# Patient Record
Sex: FEMALE | Race: BLACK OR AFRICAN AMERICAN | Employment: OTHER | RURAL
[De-identification: names, ages, dates, MRNs, and addresses within clinical notes are randomized per-mention and may not be internally consistent; named-entity substitution may affect disease eponyms.]

---

## 2017-02-20 DIAGNOSIS — I10 ESSENTIAL HYPERTENSION: ICD-10-CM

## 2017-02-20 RX ORDER — METOPROLOL SUCCINATE 50 MG/1
75 TABLET, EXTENDED RELEASE ORAL DAILY
Qty: 135 TAB | Refills: 3 | Status: SHIPPED | OUTPATIENT
Start: 2017-02-20 | End: 2018-01-30 | Stop reason: SDUPTHER

## 2017-02-28 DIAGNOSIS — I10 ESSENTIAL HYPERTENSION: ICD-10-CM

## 2017-02-28 RX ORDER — CHLORTHALIDONE 25 MG/1
25 TABLET ORAL DAILY
Qty: 90 TAB | Refills: 1 | Status: SHIPPED | OUTPATIENT
Start: 2017-02-28 | End: 2017-09-24 | Stop reason: SDUPTHER

## 2017-04-07 RX ORDER — POTASSIUM CHLORIDE 20 MEQ/1
20 TABLET, EXTENDED RELEASE ORAL DAILY
Qty: 90 TAB | Refills: 3 | Status: SHIPPED | OUTPATIENT
Start: 2017-04-07 | End: 2018-04-28 | Stop reason: SDUPTHER

## 2017-05-22 RX ORDER — AMLODIPINE BESYLATE 10 MG/1
10 TABLET ORAL DAILY
Qty: 90 TAB | Refills: 3 | Status: SHIPPED | OUTPATIENT
Start: 2017-05-22 | End: 2018-05-24 | Stop reason: SDUPTHER

## 2017-06-07 RX ORDER — ZOLPIDEM TARTRATE 10 MG/1
10 TABLET ORAL
Qty: 30 TAB | Refills: 1 | Status: SHIPPED | OUTPATIENT
Start: 2017-06-07 | End: 2017-08-24 | Stop reason: SDUPTHER

## 2017-07-12 ENCOUNTER — OFFICE VISIT (OUTPATIENT)
Dept: FAMILY MEDICINE CLINIC | Age: 72
End: 2017-07-12

## 2017-07-12 VITALS
SYSTOLIC BLOOD PRESSURE: 126 MMHG | TEMPERATURE: 97.7 F | DIASTOLIC BLOOD PRESSURE: 74 MMHG | RESPIRATION RATE: 16 BRPM | OXYGEN SATURATION: 98 % | HEART RATE: 75 BPM | BODY MASS INDEX: 29.7 KG/M2 | HEIGHT: 68 IN | WEIGHT: 196 LBS

## 2017-07-12 DIAGNOSIS — I10 ESSENTIAL HYPERTENSION, BENIGN: Primary | ICD-10-CM

## 2017-07-12 DIAGNOSIS — N32.81 OAB (OVERACTIVE BLADDER): ICD-10-CM

## 2017-07-12 DIAGNOSIS — M79.7 FIBROMYALGIA: ICD-10-CM

## 2017-07-12 DIAGNOSIS — E78.00 HIGH CHOLESTEROL: ICD-10-CM

## 2017-07-12 LAB
BILIRUB UR QL STRIP: NORMAL
GLUCOSE UR-MCNC: NEGATIVE MG/DL
KETONES P FAST UR STRIP-MCNC: NORMAL MG/DL
PH UR STRIP: 7 [PH] (ref 4.6–8)
PROT UR QL STRIP: NORMAL MG/DL
SP GR UR STRIP: 1.01 (ref 1–1.03)
UA UROBILINOGEN AMB POC: NORMAL (ref 0.2–1)
URINALYSIS CLARITY POC: CLEAR
URINALYSIS COLOR POC: NORMAL
URINE BLOOD POC: NEGATIVE
URINE LEUKOCYTES POC: NORMAL
URINE NITRITES POC: NEGATIVE

## 2017-07-12 RX ORDER — FLUOXETINE HYDROCHLORIDE 20 MG/1
CAPSULE ORAL
Refills: 0 | COMMUNITY
Start: 2017-05-01 | End: 2021-11-16 | Stop reason: DRUGHIGH

## 2017-07-12 RX ORDER — MELOXICAM 15 MG/1
TABLET ORAL
Refills: 0 | COMMUNITY
Start: 2017-06-16 | End: 2018-05-11 | Stop reason: SDUPTHER

## 2017-07-12 RX ORDER — OXYBUTYNIN CHLORIDE 5 MG/1
5 TABLET ORAL 2 TIMES DAILY
Qty: 60 TAB | Refills: 11 | Status: SHIPPED | OUTPATIENT
Start: 2017-07-12 | End: 2018-07-17 | Stop reason: SDUPTHER

## 2017-07-12 RX ORDER — LOSARTAN POTASSIUM 50 MG/1
50 TABLET ORAL DAILY
Qty: 90 TAB | Refills: 3 | Status: SHIPPED | OUTPATIENT
Start: 2017-07-12 | End: 2018-07-26 | Stop reason: SDUPTHER

## 2017-07-12 RX ORDER — ATORVASTATIN CALCIUM 40 MG/1
TABLET, FILM COATED ORAL
Qty: 90 TAB | Refills: 3 | Status: SHIPPED | OUTPATIENT
Start: 2017-07-12 | End: 2018-07-27 | Stop reason: SDUPTHER

## 2017-07-12 NOTE — MR AVS SNAPSHOT
Visit Information Date & Time Provider Department Dept. Phone Encounter #  
 7/12/2017  9:30 AM Carolynn Tello MD [address] [place] [address] [id number] Follow-up Instructions Return in about 3 months (around 10/12/2017). Follow-up and Disposition History Upcoming Health Maintenance Date Due Hepatitis C Screening 1945 DTaP/Tdap/Td series (1 - Tdap) 10/17/1966 FOBT Q 1 YEAR AGE 50-75 10/17/1995 ZOSTER VACCINE AGE 60> 10/17/2005 GLAUCOMA SCREENING Q2Y 10/17/2010 OSTEOPOROSIS SCREENING (DEXA) 10/17/2010 BREAST CANCER SCRN MAMMOGRAM 10/29/2017 INFLUENZA AGE 9 TO ADULT 8/1/2017 MEDICARE YEARLY EXAM 8/11/2017 Pneumococcal 65+ Low/Medium Risk (2 of 2 - PPSV23) 3/4/2020 Allergies as of 7/12/2017  Review Complete On: 7/12/2017 By: Carolynn Tello MD  
 No Known Allergies Current Immunizations  Reviewed on 7/12/2017 Name Date Influenza Vaccine 9/25/2015, 10/9/2014 12:00 AM, 10/2/2013 12:00 AM, 11/19/2012 12:00 AM  
 Pneumococcal Conjugate (PCV-13) 6/11/2017 Pneumococcal Polysaccharide (PPSV-23) 10/1/2010 12:00 AM  
 Pneumococcal Vaccine (Unspecified Type) 3/4/2015, 1/26/2015 12:00 AM  
 Zoster Vaccine, Live 12/22/2015 12:00 AM  
  
 Reviewed by Palmira Ackerman LPN on 6/51/7382 at  9:42 AM  
 Reviewed by Palmira Ackerman LPN on 5/91/4252 at  9:45 AM  
You Were Diagnosed With   
  
 Codes Comments Essential hypertension, benign    -  Primary ICD-10-CM: I10 
ICD-9-CM: 401.1 Fibromyalgia     ICD-10-CM: M79.7 ICD-9-CM: 729.1 High cholesterol     ICD-10-CM: E78.00 ICD-9-CM: 272.0   
 OAB (overactive bladder)     ICD-10-CM: N32.81 ICD-9-CM: 596.51 Vitals BP Pulse Temp Resp Height(growth percentile) Weight(growth percentile) 126/74 75 97.7 °F (36.5 °C) (Oral) 16 5' 8\" (1.727 m) 196 lb (88.9 kg) SpO2 BMI OB Status Smoking Status 98% 29.8 kg/m2 Postmenopausal Never Smoker Vitals History BMI and BSA Data Body Mass Index Body Surface Area  
 29.8 kg/m 2 2.07 m 2 Preferred Pharmacy Pharmacy Name Phone [facility] [phone] Your Updated Medication List  
  
   
This list is accurate as of: 7/12/17 10:41 AM.  Always use your most recent med list. amLODIPine 10 mg tablet Commonly known as:  Noe Inks Take 1 Tab by mouth daily. Indications: pressure ARTIFICIAL TEARS(NAXN90-XKEIL) ophthalmic solution Generic drug:  dextran 70-hypromellose Administer 2 Drops to both eyes as needed. Indications: DRY EYE  
  
 ascorbic acid (vitamin C) 1,000 mg tablet Commonly known as:  VITAMIN C Take  by mouth. atorvastatin 40 mg tablet Commonly known as:  LIPITOR  
take 1 tablet by mouth once daily for heart and cholesterol CALCIUM 600 PO Take 600 mg by mouth daily. CENTRUM SILVER ULTRA WOMEN'S PO Take  by mouth. chlorthalidone 25 mg tablet Commonly known as:  Margo Presto Take 1 Tab by mouth daily. Indications: Edema, hypertension  
  
 cholecalciferol (VITAMIN D3) 5,000 unit Tab tablet Commonly known as:  VITAMIN D3 Take  by mouth daily. cyanocobalamin 1,000 mcg tablet Take 1,000 mcg by mouth daily. Indications: PREVENTION OF VITAMIN B12 DEFICIENCY  
  
 cyclobenzaprine 10 mg tablet Commonly known as:  FLEXERIL Take 20 mg by mouth. Take 20 mg pc dinner for fibromyalgia and to aid sleep. Indications: FIBROMYALGIA, MUSCLE SPASM  
  
 diphenhydrAMINE 25 mg capsule Commonly known as:  BENADRYL Take 50 mg by mouth nightly as needed. fish oil-dha-epa 1,200-144-216 mg Cap Take  by mouth. flaxseed 1,000 mg Cap Take 1,000 mg by mouth daily. FLUoxetine 20 mg capsule Commonly known as:  PROzac  
  
 gabapentin 300 mg capsule Commonly known as:  NEURONTIN  
take 1 capsule by mouth three times a day losartan 50 mg tablet Commonly known as:  COZAAR Take 1 Tab by mouth daily. Indications: hypertension, pressure  
  
 meloxicam 15 mg tablet Commonly known as:  MOBIC  
  
 metoprolol succinate 50 mg XL tablet Commonly known as:  TOPROL-XL Take 1.5 Tabs by mouth daily. Indications: hypertension MIRALAX 17 gram packet Generic drug:  polyethylene glycol Take 17 g by mouth daily. morinda citrifolia fruit 250 mg Cap Take 250 mg by mouth. omeprazole 40 mg capsule Commonly known as:  PRILOSEC  
take 1 capsule by mouth once daily  
  
 oxybutynin 5 mg tablet Commonly known as:  QGJDOHXI Take 1 Tab by mouth two (2) times a day. Indications: overactive bladder  
  
 potassium chloride 20 mEq tablet Commonly known as:  K-DUR, KLOR-CON Take 1 Tab by mouth daily. Indications: mineral  
  
 psyllium seed-sucrose Powd Take 1 Cap by mouth daily. (capful)   Indications: CONSTIPATION  
  
 traMADol 50 mg tablet Commonly known as:  ULTRAM  
2 tabs 2 times per day for pain  Indications: FIBROMYALGIA, PAIN  
  
 zolpidem 10 mg tablet Commonly known as:  AMBIEN Take 1 Tab by mouth nightly as needed for Sleep. Max Daily Amount: 10 mg. Indications: sleep Prescriptions Sent to Pharmacy Refills  
 atorvastatin (LIPITOR) 40 mg tablet 3 Sig: take 1 tablet by mouth once daily for heart and cholesterol Class: Normal  
 Pharmacy: [address] Ph #: [phone]  
 losartan (COZAAR) 50 mg tablet 3 Sig: Take 1 Tab by mouth daily. Indications: hypertension, pressure Class: Normal  
 Pharmacy: [address] Ph #: [phone] Route: Oral  
 oxybutynin (DITROPAN) 5 mg tablet 11 Sig: Take 1 Tab by mouth two (2) times a day. Indications: overactive bladder Class: Normal  
 Pharmacy: [address] Ph #: [phone] Route: Oral  
  
We Performed the Following AMB POC URINALYSIS DIP STICK AUTO W/O MICRO [85456 CPT(R)] Comments: AMB POC URINALYSIS DIP STICK AUTO W/O  
 LIPID PANEL [55921 CPT(R)] METABOLIC PANEL, COMPREHENSIVE [31352 CPT(R)] Follow-up Instructions Return in about 3 months (around 10/12/2017). Patient Instructions Please have your gynecologist check you for bladder drop and urethral caruncle, those can cause overactive bladder symptoms/leaks, but are not treated with the pill I've given you. Urge Incontinence in Women/overactive bladder: Care Instructions Your Care Instructions Urge incontinence occurs when the need to urinate is so strong that you cannot reach the toilet in time, even when your bladder contains only a small amount of urine. This is also called overactive bladder or unstable bladder. Some women may have no warning before they leak urine. This condition does not cause major health problems, but it can be embarrassing and can affect a woman's self-esteem and confidence. Treatment can cure or improve your symptoms. Follow-up care is a key part of your treatment and safety. Be sure to make and go to all appointments, and call your doctor if you are having problems. It's also a good idea to know your test results and keep a list of the medicines you take. How can you care for yourself at home? · Take your medicines exactly as prescribed. Call your doctor if you think you are having a problem with your medicine. You will get more details on the specific medicines your doctor prescribes. · Limit caffeine and alcoholthey stimulate urine production. · Urinate every 2 to 4 hours during waking hours, even if you feel that you do not have to go. · Do pelvic floor (Kegel) exercises, which tighten and strengthen pelvic muscles. To do Kegel exercises: 
¨ Squeeze the same muscles you would use to stop your urine. Your belly and thighs should not move. ¨ Hold the squeeze for 3 seconds, then relax for 3 seconds. ¨ Start with 3 seconds. Then add 1 second each week until you are able to squeeze for 10 seconds. ¨ Repeat the exercise 10 to 15 times for each session. Do three or more sessions each day. · Try wearing pads that absorb the leaks. · Keep skin in the genital area dry. When should you call for help? Call your doctor now or seek immediate medical care if: 
· You have symptoms of a urinary infection. For example: ¨ You have blood or pus in your urine. ¨ You have pain in your back just below your rib cage. This is called flank pain. ¨ You have a fever, chills, or body aches. ¨ It hurts to urinate. ¨ You have groin or belly pain. Watch closely for changes in your health, and be sure to contact your doctor if: 
· You have a hard time urinating when your bladder feels full. · You feel like you need to urinate often. · Your bladder feels full even after you urinate. · Your symptoms are getting worse. Where can you learn more? Go to http://sandi-kendall.info/. Enter Y366 in the search box to learn more about \"Urge Incontinence in Women: Care Instructions. \" Current as of: October 13, 2016 Content Version: 11.3 © 9806-9548 [employer]. Care instructions adapted under license by [employer] (which disclaims liability or warranty for this information). If you have questions about a medical condition or this instruction, always ask your healthcare professional. [place] [address] any warranty or liability for your use of this information. Kegel Exercises: Care Instructions Your Care Instructions Kegel exercises strengthen muscles around the bladder. These muscles control the flow of urine. Kegel exercises are sometime called \"pelvic floor\" exercises. They can help prevent urine leakage and keep the pelvic organs in place. A woman who just had a baby might want to try Kegel exercises. They can strengthen pelvic muscles that have been weakened by pregnancy and childbirth. A man or woman may use Kegel exercises to treat urine leakage. You do Kegel exercises by tightening the muscles you use when you urinate. You will likely need to do these exercises for several weeks to get better. Follow-up care is a key part of your treatment and safety. Be sure to make and go to all appointments, and call your doctor if you are having problems. It's also a good idea to know your test results and keep a list of the medicines you take. How can you care for yourself at home? · Do Kegel exercises. ¨ Find the muscles you need to strengthen. To do this, tighten the muscles that stop your urine while you are going to the bathroom. These are the same muscles you squeeze during Kegel exercises. ¨ Squeeze the muscles as hard as you can. Your belly and thighs should not move. ¨ Hold the squeeze for 3 seconds. Then relax for 3 seconds. ¨ Start with 3 seconds, and then add 1 second each week until you are able to squeeze for 10 seconds. ¨ Repeat the exercise 10 to 15 times for each session. Do three or more sessions each day. · You can check to see if you are using the right muscles. Place a finger in your vagina and squeeze around it. You are doing them right when you feel pressure around your finger. Your doctor may also suggest that you put special weights in your vagina while you do the exercises. · Do not smoke. It can irritate the bladder. If you need help quitting, talk to your doctor about stop-smoking programs and medicines. These can increase your chances of quitting for good. Where can you learn more? Go to http://sandi-kendall.info/. Enter M909 in the search box to learn more about \"Kegel Exercises: Care Instructions. \" Current as of: August 12, 2016 Content Version: 11.3 © 3576-4387 [employer]. Care instructions adapted under license by [employer] (which disclaims liability or warranty for this information). If you have questions about a medical condition or this instruction, always ask your healthcare professional. [address] any warranty or liability for your use of this information. Patient Instructions History Introducing \[facility]\"" & HEALTH SERVICES! [facility] introduces Aw[employer] patient portal. Now you can access parts of your medical record, email your doctor's office, and request medication refills online. 1. In your internet browser, go to [email or web address] 2. Click on the First Time User? Click Here link in the Sign In box. You will see the New Member Sign Up page. 3. Enter your Awesomi Access Code exactly as it appears below. You will not need to use this code after youve completed the sign-up process. If you do not sign up before the expiration date, you must request a new code. · Awesomi Access Code: [email or web address] Expires: 10/10/2017 10:41 AM 
 
4. Enter the last four digits of your Social Security Number (xxxx) and Date of Birth (mm/dd/yyyy) as indicated and click Submit. You will be taken to the next sign-up page. 5. Create a Awesomi ID. This will be your Awesomi login ID and cannot be changed, so think of one that is secure and easy to remember. 6. Create a Awesomi password. You can change your password at any time. 7. Enter your Password Reset Question and Answer. This can be used at a later time if you forget your password. 8. Enter your e-mail address. You will receive e-mail notification when new information is available in [address] 9. Click Sign Up. You can now view and download portions of your medical record. 10. Click the Download Summary menu link to download a portable copy of your medical information. If you have questions, please visit the Frequently Asked Questions section of the Checkt website. Remember, Fobbler is NOT to be used for urgent needs. For medical emergencies, dial 911. Now available from your iPhone and Android! Please provide this summary of care documentation to your next provider. Your primary care clinician is listed as Yovani Edgar. If you have any questions after today's visit, please call [phone].

## 2017-07-12 NOTE — PATIENT INSTRUCTIONS
Please have your gynecologist check you for bladder drop and urethral caruncle, those can cause overactive bladder symptoms/leaks, but are not treated with the pill I've given you. Urge Incontinence in Women/overactive bladder: Care Instructions  Your Care Instructions  Urge incontinence occurs when the need to urinate is so strong that you cannot reach the toilet in time, even when your bladder contains only a small amount of urine. This is also called overactive bladder or unstable bladder. Some women may have no warning before they leak urine. This condition does not cause major health problems, but it can be embarrassing and can affect a woman's self-esteem and confidence. Treatment can cure or improve your symptoms. Follow-up care is a key part of your treatment and safety. Be sure to make and go to all appointments, and call your doctor if you are having problems. It's also a good idea to know your test results and keep a list of the medicines you take. How can you care for yourself at home? · Take your medicines exactly as prescribed. Call your doctor if you think you are having a problem with your medicine. You will get more details on the specific medicines your doctor prescribes. · Limit caffeine and alcoholthey stimulate urine production. · Urinate every 2 to 4 hours during waking hours, even if you feel that you do not have to go. · Do pelvic floor (Kegel) exercises, which tighten and strengthen pelvic muscles. To do Kegel exercises:  ¨ Squeeze the same muscles you would use to stop your urine. Your belly and thighs should not move. ¨ Hold the squeeze for 3 seconds, then relax for 3 seconds. ¨ Start with 3 seconds. Then add 1 second each week until you are able to squeeze for 10 seconds. ¨ Repeat the exercise 10 to 15 times for each session. Do three or more sessions each day. · Try wearing pads that absorb the leaks. · Keep skin in the genital area dry.   When should you call for help?  Call your doctor now or seek immediate medical care if:  · You have symptoms of a urinary infection. For example:  ¨ You have blood or pus in your urine. ¨ You have pain in your back just below your rib cage. This is called flank pain. ¨ You have a fever, chills, or body aches. ¨ It hurts to urinate. ¨ You have groin or belly pain. Watch closely for changes in your health, and be sure to contact your doctor if:  · You have a hard time urinating when your bladder feels full. · You feel like you need to urinate often. · Your bladder feels full even after you urinate. · Your symptoms are getting worse. Where can you learn more? Go to http://sandi-kendall.info/. Enter F718 in the search box to learn more about \"Urge Incontinence in Women: Care Instructions. \"  Current as of: October 13, 2016  Content Version: 11.3  © 4215-9299 [employer]. Care instructions adapted under license by [employer] (which disclaims liability or warranty for this information). If you have questions about a medical condition or this instruction, always ask your healthcare professional. [address] any warranty or liability for your use of this information. Kegel Exercises: Care Instructions  Your Care Instructions  Kegel exercises strengthen muscles around the bladder. These muscles control the flow of urine. Kegel exercises are sometime called \"pelvic floor\" exercises. They can help prevent urine leakage and keep the pelvic organs in place. A woman who just had a baby might want to try Kegel exercises. They can strengthen pelvic muscles that have been weakened by pregnancy and childbirth. A man or woman may use Kegel exercises to treat urine leakage. You do Kegel exercises by tightening the muscles you use when you urinate. You will likely need to do these exercises for several weeks to get better.   Follow-up care is a key part of your treatment and safety. Be sure to make and go to all appointments, and call your doctor if you are having problems. It's also a good idea to know your test results and keep a list of the medicines you take. How can you care for yourself at home? · Do Kegel exercises. ¨ Find the muscles you need to strengthen. To do this, tighten the muscles that stop your urine while you are going to the bathroom. These are the same muscles you squeeze during Kegel exercises. ¨ Squeeze the muscles as hard as you can. Your belly and thighs should not move. ¨ Hold the squeeze for 3 seconds. Then relax for 3 seconds. ¨ Start with 3 seconds, and then add 1 second each week until you are able to squeeze for 10 seconds. ¨ Repeat the exercise 10 to 15 times for each session. Do three or more sessions each day. · You can check to see if you are using the right muscles. Place a finger in your vagina and squeeze around it. You are doing them right when you feel pressure around your finger. Your doctor may also suggest that you put special weights in your vagina while you do the exercises. · Do not smoke. It can irritate the bladder. If you need help quitting, talk to your doctor about stop-smoking programs and medicines. These can increase your chances of quitting for good. Where can you learn more? Go to http://sandi-kendall.info/. Enter H533 in the search box to learn more about \"Kegel Exercises: Care Instructions. \"  Current as of: August 12, 2016  Content Version: 11.3  © 3903-1013 [employer]. Care instructions adapted under license by [employer] (which disclaims liability or warranty for this information). If you have questions about a medical condition or this instruction, always ask your healthcare professional. [address] any warranty or liability for your use of this information.

## 2017-07-13 LAB
ALBUMIN SERPL-MCNC: 4.5 G/DL (ref 3.5–4.8)
ALBUMIN/GLOB SERPL: 1.6 {RATIO} (ref 1.2–2.2)
ALP SERPL-CCNC: 122 IU/L (ref 39–117)
ALT SERPL-CCNC: 18 IU/L (ref 0–32)
AST SERPL-CCNC: 17 IU/L (ref 0–40)
BILIRUB SERPL-MCNC: 0.3 MG/DL (ref 0–1.2)
BUN SERPL-MCNC: 22 MG/DL (ref 8–27)
BUN/CREAT SERPL: 18 (ref 12–28)
CALCIUM SERPL-MCNC: 9.9 MG/DL (ref 8.7–10.3)
CHLORIDE SERPL-SCNC: 97 MMOL/L (ref 96–106)
CHOLEST SERPL-MCNC: 182 MG/DL (ref 100–199)
CO2 SERPL-SCNC: 26 MMOL/L (ref 18–29)
CREAT SERPL-MCNC: 1.24 MG/DL (ref 0.57–1)
GLOBULIN SER CALC-MCNC: 2.9 G/DL (ref 1.5–4.5)
GLUCOSE SERPL-MCNC: 76 MG/DL (ref 65–99)
HDLC SERPL-MCNC: 58 MG/DL
LDLC SERPL CALC-MCNC: 88 MG/DL (ref 0–99)
POTASSIUM SERPL-SCNC: 4.4 MMOL/L (ref 3.5–5.2)
PROT SERPL-MCNC: 7.4 G/DL (ref 6–8.5)
SODIUM SERPL-SCNC: 140 MMOL/L (ref 134–144)
TRIGL SERPL-MCNC: 178 MG/DL (ref 0–149)
VLDLC SERPL CALC-MCNC: 36 MG/DL (ref 5–40)

## 2017-08-28 DIAGNOSIS — M79.7 FIBROMYALGIA: ICD-10-CM

## 2017-08-31 RX ORDER — GABAPENTIN 300 MG/1
CAPSULE ORAL
Qty: 90 CAP | Refills: 5 | Status: SHIPPED | OUTPATIENT
Start: 2017-08-31 | End: 2017-12-18 | Stop reason: SDUPTHER

## 2017-09-24 DIAGNOSIS — I10 ESSENTIAL HYPERTENSION: ICD-10-CM

## 2017-09-25 RX ORDER — CHLORTHALIDONE 25 MG/1
TABLET ORAL
Qty: 90 TAB | Refills: 1 | Status: SHIPPED | OUTPATIENT
Start: 2017-09-25 | End: 2018-03-16 | Stop reason: SDUPTHER

## 2017-10-11 ENCOUNTER — OFFICE VISIT (OUTPATIENT)
Dept: FAMILY MEDICINE CLINIC | Age: 72
End: 2017-10-11

## 2017-10-11 VITALS
BODY MASS INDEX: 29.55 KG/M2 | OXYGEN SATURATION: 97 % | DIASTOLIC BLOOD PRESSURE: 78 MMHG | HEIGHT: 68 IN | HEART RATE: 71 BPM | SYSTOLIC BLOOD PRESSURE: 123 MMHG | WEIGHT: 195 LBS | TEMPERATURE: 97.9 F

## 2017-10-11 DIAGNOSIS — M79.7 FIBROMYALGIA: ICD-10-CM

## 2017-10-11 DIAGNOSIS — I10 ESSENTIAL HYPERTENSION, BENIGN: ICD-10-CM

## 2017-10-11 DIAGNOSIS — Z00.00 MEDICARE ANNUAL WELLNESS VISIT, SUBSEQUENT: Primary | ICD-10-CM

## 2017-10-11 DIAGNOSIS — Z23 ENCOUNTER FOR IMMUNIZATION: ICD-10-CM

## 2017-10-11 DIAGNOSIS — K21.9 GASTROESOPHAGEAL REFLUX DISEASE WITHOUT ESOPHAGITIS: ICD-10-CM

## 2017-10-11 DIAGNOSIS — E78.00 HIGH CHOLESTEROL: ICD-10-CM

## 2017-10-11 DIAGNOSIS — Z13.31 SCREENING FOR DEPRESSION: ICD-10-CM

## 2017-10-11 DIAGNOSIS — Z13.39 SCREENING FOR ALCOHOLISM: ICD-10-CM

## 2017-10-11 NOTE — PATIENT INSTRUCTIONS

## 2017-10-11 NOTE — MR AVS SNAPSHOT
Visit Information Date & Time Provider Department Dept. Phone Encounter #  
 10/11/2017 10:10 AM Verna Booth MD [address] [place] [address] [id number] Upcoming Health Maintenance Date Due Hepatitis C Screening 1945 OSTEOPOROSIS SCREENING (DEXA) 10/17/2010 INFLUENZA AGE 9 TO ADULT 8/1/2017 MEDICARE YEARLY EXAM 8/11/2017 BREAST CANCER SCRN MAMMOGRAM 10/29/2017 GLAUCOMA SCREENING Q2Y 11/29/2018 COLONOSCOPY 6/27/2023 DTaP/Tdap/Td series (2 - Td) 10/11/2027 Allergies as of 10/11/2017  Review Complete On: 10/11/2017 By: Verna Booth MD  
 No Known Allergies Current Immunizations  Reviewed on 10/11/2017 Name Date Influenza High Dose Vaccine PF 10/11/2017 Influenza Vaccine 9/25/2015, 10/9/2014 12:00 AM, 10/2/2013 12:00 AM, 11/19/2012 12:00 AM  
 Pneumococcal Conjugate (PCV-13) 6/11/2017 Pneumococcal Polysaccharide (PPSV-23) 10/1/2010 12:00 AM  
 Pneumococcal Vaccine (Unspecified Type) 3/4/2015, 1/26/2015 12:00 AM  
 Zoster Vaccine, Live 12/22/2015 12:00 AM  
  
 Reviewed by Tyrese Garcia LPN on 26/72/9505 at 10:36 AM  
You Were Diagnosed With   
  
 Codes Comments Medicare annual wellness visit, subsequent    -  Primary ICD-10-CM: Z00.00 ICD-9-CM: V70.0 Encounter for immunization     ICD-10-CM: S07 ICD-9-CM: V03.89 Essential hypertension, benign     ICD-10-CM: I10 
ICD-9-CM: 401.1 Fibromyalgia     ICD-10-CM: M79.7 ICD-9-CM: 729.1 Gastroesophageal reflux disease without esophagitis     ICD-10-CM: K21.9 ICD-9-CM: 530.81 High cholesterol     ICD-10-CM: E78.00 ICD-9-CM: 272.0 Screening for alcoholism     ICD-10-CM: Z13.89 ICD-9-CM: V79.1 Screening for depression     ICD-10-CM: Z13.89 ICD-9-CM: V79.0 Vitals BP Pulse Temp Height(growth percentile) Weight(growth percentile) SpO2  
 123/78 71 97.9 °F (36.6 °C) (Temporal) 5' 8\" (1.727 m) 195 lb (88.5 kg) 97% BMI OB Status Smoking Status 29.65 kg/m2 Postmenopausal Never Smoker BMI and BSA Data Body Mass Index Body Surface Area  
 29.65 kg/m 2 2.06 m 2 Preferred Pharmacy Pharmacy Name Phone [facility], [facility] [phone] Your Updated Medication List  
  
   
This list is accurate as of: 10/11/17 11:03 AM.  Always use your most recent med list. amLODIPine 10 mg tablet Commonly known as:  Wiyot Citron Take 1 Tab by mouth daily. Indications: pressure ARTIFICIAL TEARS([id number]) ophthalmic solution Generic drug:  dextran 70-hypromellose Administer 2 Drops to both eyes as needed. Indications: DRY EYE  
  
 ascorbic acid (vitamin C) 1,000 mg tablet Commonly known as:  VITAMIN C Take  by mouth. atorvastatin 40 mg tablet Commonly known as:  LIPITOR  
take 1 tablet by mouth once daily for heart and cholesterol CALCIUM 600 PO Take 600 mg by mouth daily. CENTRUM SILVER ULTRA WOMEN'S PO Take  by mouth. chlorthalidone 25 mg tablet Commonly known as:  HYGROTEN  
take 1 tablet by mouth once daily  
  
 cholecalciferol (VITAMIN D3) 5,000 unit Tab tablet Commonly known as:  VITAMIN D3 Take  by mouth daily. cyanocobalamin 1,000 mcg tablet Take 1,000 mcg by mouth daily. Indications: PREVENTION OF VITAMIN B12 DEFICIENCY  
  
 cyclobenzaprine 10 mg tablet Commonly known as:  FLEXERIL Take 20 mg by mouth. Take 20 mg pc dinner for fibromyalgia and to aid sleep. Indications: FIBROMYALGIA, MUSCLE SPASM  
  
 diphenhydrAMINE 25 mg capsule Commonly known as:  BENADRYL Take 50 mg by mouth nightly as needed. fish oil-dha-epa 1,200-144-216 mg Cap Take  by mouth. flaxseed 1,000 mg Cap Take 1,000 mg by mouth daily. FLUoxetine 20 mg capsule Commonly known as:  PROzac  
  
 gabapentin 300 mg capsule Commonly known as:  NEURONTIN  
 take 1 capsule by mouth three times a day  Indications: nerve pain  
  
 losartan 50 mg tablet Commonly known as:  COZAAR Take 1 Tab by mouth daily. Indications: hypertension, pressure  
  
 meloxicam 15 mg tablet Commonly known as:  MOBIC  
  
 metoprolol succinate 50 mg XL tablet Commonly known as:  TOPROL-XL Take 1.5 Tabs by mouth daily. Indications: hypertension MIRALAX 17 gram packet Generic drug:  polyethylene glycol Take 17 g by mouth daily. morinda citrifolia fruit 250 mg Cap Take 250 mg by mouth. omeprazole 40 mg capsule Commonly known as:  PRILOSEC  
take 1 capsule by mouth once daily  
  
 oxybutynin 5 mg tablet Commonly known as:  DTRDQIKY Take 1 Tab by mouth two (2) times a day. Indications: overactive bladder  
  
 potassium chloride 20 mEq tablet Commonly known as:  K-DUR, KLOR-CON Take 1 Tab by mouth daily. Indications: mineral  
  
 psyllium seed-sucrose Powd Take 1 Cap by mouth daily. (capful)   Indications: CONSTIPATION  
  
 traMADol 50 mg tablet Commonly known as:  ULTRAM  
2 tabs 2 times per day for pain  Indications: FIBROMYALGIA, PAIN  
  
 zolpidem 10 mg tablet Commonly known as:  AMBIEN Take 1 Tab by mouth nightly as needed for Sleep. Max Daily Amount: 10 mg. We Performed the Following ADMIN INFLUENZA VIRUS VAC [ HCP] [address] [[id number] [facility]] INFLUENZA VIRUS VACCINE, HIGH DOSE SEASONAL, PRESERVATIVE FREE [39243 CPT(R)] [place] ANNUAL ALCOHOL SCREEN 15 MIN [id number] [facility]] Patient Instructions Medicare Wellness Visit, Female The best way to live healthy is to have a healthy lifestyle by eating a well-balanced diet, exercising regularly, limiting alcohol and stopping smoking. Regular physical exams and screening tests are another way to keep healthy.  Preventive exams provided by your health care provider can find health problems before they become diseases or illnesses. Preventive services including immunizations, screening tests, monitoring and exams can help you take care of your own health. All people over age 72 should have a pneumovax  and and a prevnar shot to prevent pneumonia. These are once in a lifetime unless you and your provider decide differently. All people over 65 should have a yearly flu shot and a tetanus vaccine every 10 years. A bone mass density to screen for osteoporosis or thinning of the bones should be done every 2 years after 65. Screening for diabetes mellitus with a blood sugar test should be done every year. Glaucoma is a disease of the eye due to increased ocular pressure that can lead to blindness and it should be done every year by an eye professional. 
 
Cardiovascular screening tests that check for elevated lipids (fatty part of blood) which can lead to heart disease and strokes should be done every 5 years. Colorectal screening that evaluates for blood or polyps in your colon should be done yearly as a stool test or every five years as a flexible sigmoidoscope or every 10 years as a colonoscopy up to age 76. Breast cancer screening with a mammogram is recommended biennially  for women age 54-69. Screening for cervical cancer with a pap smear and pelvic exam is recommended for women after age 72 years every 2 years up to age 79 or when the provider and patient decide to stop. If there is a history of cervical abnormalities or other increased risk for cancer then the test is recommended yearly. Hepatitis C screening is also recommended for anyone born between [birth year] through [address]. A shingles vaccine is also recommended once in a lifetime after age 61. Your Medicare Wellness Exam is recommended annually. Here is a list of your current Health Maintenance items with a due date: 
Health Maintenance Due Topic Date Due  
 Hepatitis C Test  1945  Bone Density Screening  10/17/2010  Flu Vaccine  08/01/2017 [facility] Annual Well Visit  08/11/2017  Breast Cancer Screening  10/29/2017 If you have any questions regarding [employer], you may call [employer] support at [phone]. Patient Instructions History Introducing [facility]! Romayne Hailey introduces [employer] patient portal. Now you can access parts of your medical record, email your doctor's office, and request medication refills online. 1. In your internet browser, go to [email or web address] 2. Click on the First Time User? Click Here link in the Sign In box. You will see the New Member Sign Up page. 3. Enter your [employer] Access Code exactly as it appears below. You will not need to use this code after youve completed the sign-up process. If you do not sign up before the expiration date, you must request a new code. · [employer] Access Code: [facility]3] Expires: 1/9/2018  9:53 AM 
 
4. Enter the last four digits of your Social Security Number (xxxx) and Date of Birth (mm/dd/yyyy) as indicated and click Submit. You will be taken to the next sign-up page. 5. Create a [employer] ID. This will be your [employer] login ID and cannot be changed, so think of one that is secure and easy to remember. 6. Create a [employer] password. You can change your password at any time. 7. Enter your Password Reset Question and Answer. This can be used at a later time if you forget your password. 8. Enter your e-mail address. You will receive e-mail notification when new information is available in [address] 9. Click Sign Up. You can now view and download portions of your medical record. 10. Click the Download Summary menu link to download a portable copy of your medical information. If you have questions, please visit the Frequently Asked Questions section of the [employer] website. Remember, [employer] is NOT to be used for urgent needs. For medical emergencies, dial 911. Now available from your iPhone and Android! Please provide this summary of care documentation to your next provider. Your primary care clinician is listed as Sony Edgar. If you have any questions after today's visit, please call [phone].

## 2017-10-11 NOTE — PROGRESS NOTES
Zak Pritchard is a 70 y.o. female presenting for/with: Annual Wellness Visit    HPI  Hypertension. Blood pressures have been stable/in goal. Management at last visit included continuing current regimen . Current regimen: angiotensin II receptor antagonist, calcium channel blocker and thiazide diuretic. Symptoms include no symptoms. Patient denies chest pain, palpitations, peripheral edema, headache. Lab review:   Lab Results   Component Value Date/Time    Sodium 140 07/12/2017 10:18 AM    Potassium 4.4 07/12/2017 10:18 AM    Chloride 97 07/12/2017 10:18 AM    CO2 26 07/12/2017 10:18 AM    Anion gap 9 05/05/2015 03:36 AM    Glucose 76 07/12/2017 10:18 AM    BUN 22 07/12/2017 10:18 AM    Creatinine 1.24 07/12/2017 10:18 AM    BUN/Creatinine ratio 18 07/12/2017 10:18 AM    GFR est AA 50 07/12/2017 10:18 AM    GFR est non-AA 44 07/12/2017 10:18 AM    Calcium 9.9 07/12/2017 10:18 AM     Hyperlipidemia. On lipitor 40. Linda well. No myalgias, arthralgias, unusual weakness. Lab Results   Component Value Date/Time    Cholesterol, total 182 07/12/2017 10:18 AM    HDL Cholesterol 58 07/12/2017 10:18 AM    LDL, calculated 88 07/12/2017 10:18 AM    VLDL, calculated 36 07/12/2017 10:18 AM    Triglyceride 178 07/12/2017 10:18 AM       Lab Results   Component Value Date/Time    ALT (SGPT) 18 07/12/2017 10:18 AM    AST (SGOT) 17 07/12/2017 10:18 AM    Alk. phosphatase 122 07/12/2017 10:18 AM    Bilirubin, total 0.3 07/12/2017 10:18 AM     GERD  On prilosec. Working well. Fibromyalgia  Remains on prozac, flexeril, gabapentin, tramadol. Managed by Rheum Dr. Yazan Sorto MD., rheumatologist at [facility]. Still getting her tramadol filled with [facility], ok with that. Had visit early Oct 2017. PMH, SH, Medications/Allergies: reviewed, on chart.     ROS:  Constitutional: No fever, chills or weight loss  Respiratory: No cough, SOB   CV: No chest pain or Palpitations  GI: has had constipation off and on for years, interested in trying linzess. Visit Vitals    /78    Pulse 71    Temp 97.9 °F (36.6 °C) (Temporal)    Ht 5' 8\" (1.727 m)    Wt 195 lb (88.5 kg)    SpO2 97%    BMI 29.65 kg/m2     Wt Readings from Last 3 Encounters:   10/11/17 195 lb (88.5 kg)   07/12/17 196 lb (88.9 kg)   08/10/16 196 lb (88.9 kg)     BP Readings from Last 3 Encounters:   10/11/17 123/78   07/12/17 126/74   08/10/16 119/53     Physical Examination: General appearance - alert, well appearing, and in no distress  Mental status - alert, oriented to person, place, and time  Eyes - pupils equal and reactive, extraocular eye movements intact  ENT - bilateral external ears and nose normal. Normal lips  Neck - supple, no significant adenopathy, no thyromegaly or mass  Lymphatics - no palpable lymphadenopathy, no hepatosplenomegaly  Chest - clear to auscultation, no wheezes, rales or rhonchi, symmetric air entry  Heart - normal rate, regular rhythm, normal S1, S2, no murmurs, rubs, clicks or gallops  Extremities - peripheral pulses normal, no pedal edema, no clubbing or cyanosis    Results for orders placed or performed in visit on 87/30/77   METABOLIC PANEL, COMPREHENSIVE   Result Value Ref Range    Glucose 76 65 - 99 mg/dL    BUN 22 8 - 27 mg/dL    Creatinine 1.24 (H) 0.57 - 1.00 mg/dL    GFR est non-AA 44 (L) >59 mL/min/1.73    GFR est AA 50 (L) >59 mL/min/1.73    BUN/Creatinine ratio 18 12 - 28    Sodium 140 134 - 144 mmol/L    Potassium 4.4 3.5 - 5.2 mmol/L    Chloride 97 96 - 106 mmol/L    CO2 26 18 - 29 mmol/L    Calcium 9.9 8.7 - 10.3 mg/dL    Protein, total 7.4 6.0 - 8.5 g/dL    Albumin 4.5 3.5 - 4.8 g/dL    GLOBULIN, TOTAL 2.9 1.5 - 4.5 g/dL    A-G Ratio 1.6 1.2 - 2.2    Bilirubin, total 0.3 0.0 - 1.2 mg/dL    Alk.  phosphatase 122 (H) 39 - 117 IU/L    AST (SGOT) 17 0 - 40 IU/L    ALT (SGPT) 18 0 - 32 IU/L   LIPID PANEL   Result Value Ref Range    Cholesterol, total 182 100 - 199 mg/dL    Triglyceride 178 (H) 0 - 149 mg/dL    HDL Cholesterol 58 >39 mg/dL    VLDL, calculated 36 5 - 40 mg/dL    LDL, calculated 88 0 - 99 mg/dL   AMB POC URINALYSIS DIP STICK AUTO W/O MICRO   Result Value Ref Range    Color (UA POC) Dark Yellow     Clarity (UA POC) Clear     Glucose (UA POC) Negative Negative    Bilirubin (UA POC) 1+ Negative    Ketones (UA POC) Trace Negative    Specific gravity (UA POC) 1.015 1.001 - 1.035    Blood (UA POC) Negative Negative    pH (UA POC) 7.0 4.6 - 8.0    Protein (UA POC) Trace Negative mg/dL    Urobilinogen (UA POC) 2 mg/dL 0.2 - 1    Nitrites (UA POC) Negative Negative    Leukocyte esterase (UA POC) Trace Negative     A/P:  HTN  well controlled. con't current tx. Check labs    HLD  well controlled. con't current tx. Check labs    GERD  well controlled. con't current tx. OAB sx   Doing well on oxybutnin. Saw GYN exam, told no major problems. F/U 6mo, can order refills on request.    ______________________________________________________________________    Cosme Chapin is a 70 y.o. female and presents for annual Medicare Wellness Visit. Problem List: Reviewed with patient and discussed risk factors.     Patient Active Problem List   Diagnosis Code    Lumbar spondylosis M47.816    Back pain, chronic M54.9, G89.29    Rotator cuff tear arthropathy of right shoulder M12.811    Cervical spondylosis M47.812    Fibromyalgia M79.7    Insomnia G47.00    Essential hypertension, benign I10    High cholesterol E78.00    Gastroesophageal reflux disease without esophagitis K21.9    Primary osteoarthritis of left hip M16.12    Benign joint hypermobility M35.7    Primary localized osteoarthrosis, left knee M17.10    History of total hip replacement Z96.649    Abnormal kidney function N28.9    Allergic rhinitis J30.9    Irritable bowel syndrome without diarrhea K58.9       Current medical providers:  Patient Care Team:  Shiv Borrero MD as PCP - General (Family Practice)    PM, , Medications/Allergies: reviewed, on chart.    Female Alcohol Screening: On any occasion during the past 3 months, have you had more than 3 drinks containing alcohol? No    Do you average more than 7 drinks per week? No    ROS:  Constitutional: No fever, chills or weight loss  Respiratory: No cough, SOB   CV: No chest pain or Palpitations    Objective:  Visit Vitals    /78    Pulse 71    Temp 97.9 °F (36.6 °C) (Temporal)    Ht 5' 8\" (1.727 m)    Wt 195 lb (88.5 kg)    SpO2 97%    BMI 29.65 kg/m2    Body mass index is 29.65 kg/(m^2). Assessment of cognitive impairment: Alert and oriented x 3    Depression Screen:   PHQ over the last two weeks 10/11/2017   PHQ Not Done -   Little interest or pleasure in doing things Not at all   Feeling down, depressed or hopeless Not at all   Total Score PHQ 2 0       Fall Risk Assessment:    Fall Risk Assessment, last 12 mths 10/11/2017   Able to walk? Yes   Fall in past 12 months? Yes   Fall with injury? No   Number of falls in past 12 months 1   Fall Risk Score 1     Functional Ability:   Does the patient exhibit a steady gait? yes   How long did it take the patient to get up and walk from a sitting position? 2s   Is the patient self reliant?  (ie can do own laundry, meals, household chores)  yes     Does the patient handle his/her own medications? yes     Does the patient handle his/her own money? yes     Is the patients home safe (ie good lighting, handrails on stairs and bath, etc.)? yes     Did you notice or did patient express any hearing difficulties? no     Did you notice or did patient express any vision difficulties? no       Advance Care Planning:   Patient was offered the opportunity to discuss advance care planning:  yes     Does patient have an Advance Directive:  yes   If no, did you provide information on [employer]ctions? NA       Plan:      Plan labs in Feb/March- plan check Hep C then with next labs. Plans get mammo and DXA in [facility] in [place].  Has orders from Dr. Alyssia Preston. Orders Placed This Encounter    INFLUENZA VIRUS VACCINE, HIGH DOSE SEASONAL, PRESERVATIVE FREE    ADMIN INFLUENZA VIRUS VAC       Health Maintenance   Topic Date Due    Hepatitis C Screening  1945    OSTEOPOROSIS SCREENING (DEXA)  10/17/2010    INFLUENZA AGE 9 TO ADULT  08/01/2017    MEDICARE YEARLY EXAM  08/11/2017    BREAST CANCER SCRN MAMMOGRAM  10/29/2017    GLAUCOMA SCREENING Q2Y  11/29/2018    COLONOSCOPY  06/27/2023    DTaP/Tdap/Td series (2 - Td) 10/11/2027    ZOSTER VACCINE AGE 60>  Completed    Pneumococcal 65+ Low/Medium Risk  Completed       *Patient verbalized understanding and agreement with the plan. A copy of the After Visit Summary with personalized health plan was given to the patient today.

## 2017-11-27 RX ORDER — OMEPRAZOLE 40 MG/1
CAPSULE, DELAYED RELEASE ORAL
Qty: 90 CAP | Refills: 1 | Status: SHIPPED | OUTPATIENT
Start: 2017-11-27 | End: 2018-05-24 | Stop reason: SDUPTHER

## 2017-12-18 ENCOUNTER — OFFICE VISIT (OUTPATIENT)
Dept: FAMILY MEDICINE CLINIC | Age: 72
End: 2017-12-18

## 2017-12-18 VITALS
BODY MASS INDEX: 28.7 KG/M2 | HEIGHT: 68 IN | DIASTOLIC BLOOD PRESSURE: 90 MMHG | OXYGEN SATURATION: 98 % | WEIGHT: 189.4 LBS | RESPIRATION RATE: 20 BRPM | HEART RATE: 67 BPM | TEMPERATURE: 98.9 F | SYSTOLIC BLOOD PRESSURE: 140 MMHG

## 2017-12-18 DIAGNOSIS — M12.811 ROTATOR CUFF TEAR ARTHROPATHY OF RIGHT SHOULDER: ICD-10-CM

## 2017-12-18 DIAGNOSIS — E78.00 HIGH CHOLESTEROL: ICD-10-CM

## 2017-12-18 DIAGNOSIS — Z51.81 THERAPEUTIC DRUG MONITORING: ICD-10-CM

## 2017-12-18 DIAGNOSIS — M75.101 ROTATOR CUFF TEAR ARTHROPATHY OF RIGHT SHOULDER: ICD-10-CM

## 2017-12-18 DIAGNOSIS — M17.12 PRIMARY OSTEOARTHRITIS OF LEFT KNEE: ICD-10-CM

## 2017-12-18 DIAGNOSIS — M79.7 FIBROMYALGIA: Primary | ICD-10-CM

## 2017-12-18 DIAGNOSIS — I10 ESSENTIAL HYPERTENSION, BENIGN: ICD-10-CM

## 2017-12-18 DIAGNOSIS — Z11.59 ENCOUNTER FOR HEPATITIS C SCREENING TEST FOR LOW RISK PATIENT: ICD-10-CM

## 2017-12-18 RX ORDER — GABAPENTIN 300 MG/1
CAPSULE ORAL
Qty: 360 CAP | Refills: 3 | Status: SHIPPED | OUTPATIENT
Start: 2017-12-18 | End: 2019-07-24 | Stop reason: SDUPTHER

## 2017-12-18 RX ORDER — TRAMADOL HYDROCHLORIDE 50 MG/1
TABLET ORAL
Qty: 180 TAB | Refills: 2 | Status: SHIPPED | OUTPATIENT
Start: 2017-12-18 | End: 2018-06-01 | Stop reason: SDUPTHER

## 2017-12-18 NOTE — MR AVS SNAPSHOT
Visit Information Date & Time Provider Department Dept. Phone Encounter #  
 12/18/2017  8:10 AM Kelsea Bowden MD [address] [place] [address] [id number] Follow-up Instructions Return in about 3 months (around 3/18/2018). Follow-up and Disposition History Your Appointments 4/11/2018 10:10 AM  
[facility] Appt Note: 6 mo f/u  
 1000 [facility] 2200 [facility],5Th Floor [id number] [phone]  
  
   
 [address] [address] Upcoming Health Maintenance Date Due Hepatitis C Screening 1945 OSTEOPOROSIS SCREENING (DEXA) 10/17/2010 MEDICARE YEARLY EXAM 10/12/2018 GLAUCOMA SCREENING Q2Y 11/29/2018 COLONOSCOPY 6/27/2023 DTaP/Tdap/Td series (2 - Td) 10/11/2027 Allergies as of 12/18/2017  Review Complete On: 12/18/2017 By: Kelsea Bowden MD  
 No Known Allergies Current Immunizations  Reviewed on 10/11/2017 Name Date Influenza High Dose Vaccine PF 10/11/2017 Influenza Vaccine 9/25/2015, 10/9/2014 12:00 AM, 10/2/2013 12:00 AM, 11/19/2012 12:00 AM  
 Pneumococcal Conjugate (PCV-13) 6/11/2017 Pneumococcal Polysaccharide (PPSV-23) 10/1/2010 12:00 AM  
 Pneumococcal Vaccine (Unspecified Type) 3/4/2015, 1/26/2015 12:00 AM  
 Zoster Vaccine, Live 12/22/2015 12:00 AM  
  
 Not reviewed this visit You Were Diagnosed With   
  
 Codes Comments Fibromyalgia    -  Primary ICD-10-CM: M79.7 ICD-9-CM: 729.1 Primary osteoarthritis of left knee     ICD-10-CM: M17.12 
ICD-9-CM: 715.16 Rotator cuff tear arthropathy of right shoulder     ICD-10-CM: M12.811 ICD-9-CM: 716.81 High cholesterol     ICD-10-CM: E78.00 ICD-9-CM: 272.0 Essential hypertension, benign     ICD-10-CM: I10 
ICD-9-CM: 639. 1 Therapeutic drug monitoring     ICD-10-CM: Z51.81 
ICD-9-CM: V58.83 Vitals BP Pulse Temp Resp Height(growth percentile) Weight(growth percentile) 140/90 (BP 1 Location: Left arm, BP Patient Position: Sitting) 67 98.9 °F (37.2 °C) (Oral) 20 5' 8\" (1.727 m) 189 lb 6.4 oz (85.9 kg) SpO2 BMI OB Status Smoking Status 98% 28.8 kg/m2 Postmenopausal Never Smoker BMI and BSA Data Body Mass Index Body Surface Area  
 28.8 kg/m 2 2.03 m 2 Preferred Pharmacy Pharmacy Name Phone [facility], [address] [place] [address] [phone] Your Updated Medication List  
  
   
This list is accurate as of: 12/18/17  9:33 AM.  Always use your most recent med list. amLODIPine 10 mg tablet Commonly known as:  Tabitha Eagles Take 1 Tab by mouth daily. Indications: pressure ARTIFICIAL TEARS([id number]) ophthalmic solution Generic drug:  dextran 70-hypromellose Administer 2 Drops to both eyes as needed. Indications: DRY EYE  
  
 ascorbic acid (vitamin C) 1,000 mg tablet Commonly known as:  VITAMIN C Take  by mouth. atorvastatin 40 mg tablet Commonly known as:  LIPITOR  
take 1 tablet by mouth once daily for heart and cholesterol CALCIUM 600 PO Take 600 mg by mouth daily. CENTRUM SILVER ULTRA WOMEN'S PO Take  by mouth. chlorthalidone 25 mg tablet Commonly known as:  HYGROTEN  
take 1 tablet by mouth once daily  
  
 cholecalciferol (VITAMIN D3) 5,000 unit Tab tablet Commonly known as:  VITAMIN D3 Take  by mouth daily. cyanocobalamin 1,000 mcg tablet Take 1,000 mcg by mouth daily. Indications: PREVENTION OF VITAMIN B12 DEFICIENCY  
  
 cyclobenzaprine 10 mg tablet Commonly known as:  FLEXERIL Take 20 mg by mouth. Take 20 mg pc dinner for fibromyalgia and to aid sleep. Indications: FIBROMYALGIA, MUSCLE SPASM  
  
 diphenhydrAMINE 25 mg capsule Commonly known as:  BENADRYL Take 50 mg by mouth nightly as needed. fish oil-dha-epa 1,200-144-216 mg Cap Take  by mouth. flaxseed 1,000 mg Cap Take 1,000 mg by mouth daily. FLUoxetine 20 mg capsule Commonly known as:  PROzac  
  
 gabapentin 300 mg capsule Commonly known as:  NEURONTIN  
take 1 capsule by mouth three times a day  Indications: nerve pain  
  
 losartan 50 mg tablet Commonly known as:  COZAAR Take 1 Tab by mouth daily. Indications: hypertension, pressure  
  
 meloxicam 15 mg tablet Commonly known as:  MOBIC  
  
 metoprolol succinate 50 mg XL tablet Commonly known as:  TOPROL-XL Take 1.5 Tabs by mouth daily. Indications: hypertension MIRALAX 17 gram packet Generic drug:  polyethylene glycol Take 17 g by mouth daily. morinda citrifolia fruit 250 mg Cap Take 250 mg by mouth. omeprazole 40 mg capsule Commonly known as:  PRILOSEC  
take 1 capsule by mouth once daily for reflux  
  
 oxybutynin 5 mg tablet Commonly known as:  IWGTUEQE Take 1 Tab by mouth two (2) times a day. Indications: overactive bladder  
  
 potassium chloride 20 mEq tablet Commonly known as:  K-DUR, KLOR-CON Take 1 Tab by mouth daily. Indications: mineral  
  
 traMADol 50 mg tablet Commonly known as:  ULTRAM  
2 tabs 3 times per day as needed for pain  Indications: FIBROMYALGIA, Pain  
  
 zolpidem 10 mg tablet Commonly known as:  AMBIEN Take 1 Tab by mouth nightly as needed for Sleep. Max Daily Amount: 10 mg.  
  
  
  
  
Prescriptions Printed Refills  
 traMADol (ULTRAM) 50 mg tablet 2 Si tabs 3 times per day as needed for pain  Indications: FIBROMYALGIA, Pain Class: Print Prescriptions Sent to Pharmacy Refills  
 gabapentin (NEURONTIN) 300 mg capsule 3 Sig: take 1 capsule by mouth three times a day  Indications: nerve pain  
 Class: Normal  
 Pharmacy: [facility], [address] [facility] #: [phone] We Performed the Following [address] MONITORING [[id number] Custom] METABOLIC PANEL, BASIC [31709 CPT(R)] Follow-up Instructions Return in about 3 months (around 3/18/2018). Introducing [facility] & HEALTH SERVICES! [employer] introduces [employer] patient portal. Now you can access parts of your medical record, email your doctor's office, and request medication refills online. 1. In your internet browser, go to [email or web address] 2. Click on the First Time User? Click Here link in the Sign In box. You will see the New Member Sign Up page. 3. Enter your [employer] Access Code exactly as it appears below. You will not need to use this code after youve completed the sign-up process. If you do not sign up before the expiration date, you must request a new code. · [employer] Access Code: [facility] Expires: 1/9/2018  8:53 AM 
 
4. Enter the last four digits of your Social Security Number (xxxx) and Date of Birth (mm/dd/yyyy) as indicated and click Submit. You will be taken to the next sign-up page. 5. Create a [employer] ID. This will be your [employer] login ID and cannot be changed, so think of one that is secure and easy to remember. 6. Create a [employer] password. You can change your password at any time. 7. Enter your Password Reset Question and Answer. This can be used at a later time if you forget your password. 8. Enter your e-mail address. You will receive e-mail notification when new information is available in [address] 9. Click Sign Up. You can now view and download portions of your medical record. 10. Click the Download Summary menu link to download a portable copy of your medical information. If you have questions, please visit the Frequently Asked Questions section of the [employer] website. Remember, [employer] is NOT to be used for urgent needs. For medical emergencies, dial 911. Now available from your iPhone and Android! Please provide this summary of care documentation to your next provider. Your primary care clinician is listed as Kelsea Edgar. If you have any questions after today's visit, please call [phone].

## 2017-12-18 NOTE — PROGRESS NOTES
Juliane Nissen is a 67 y.o. female presenting for/with:    Medication Refill (tramadol)    HPI  Hypertension. Blood pressures have been stable/in goal. Management at last visit included continuing current regimen . Current regimen: angiotensin II receptor antagonist, calcium channel blocker and thiazide diuretic. Symptoms include no symptoms. Patient denies chest pain, palpitations, peripheral edema, headache. Lab review:   Lab Results   Component Value Date/Time    Sodium 140 07/12/2017 10:18 AM    Potassium 4.4 07/12/2017 10:18 AM    Chloride 97 07/12/2017 10:18 AM    CO2 26 07/12/2017 10:18 AM    Anion gap 9 05/05/2015 03:36 AM    Glucose 76 07/12/2017 10:18 AM    BUN 22 07/12/2017 10:18 AM    Creatinine 1.24 07/12/2017 10:18 AM    BUN/Creatinine ratio 18 07/12/2017 10:18 AM    GFR est AA 50 07/12/2017 10:18 AM    GFR est non-AA 44 07/12/2017 10:18 AM    Calcium 9.9 07/12/2017 10:18 AM     Hyperlipidemia. On lipitor 40. Starr well. No myalgias, arthralgias, unusual weakness. Lab Results   Component Value Date/Time    Cholesterol, total 182 07/12/2017 10:18 AM    HDL Cholesterol 58 07/12/2017 10:18 AM    LDL, calculated 88 07/12/2017 10:18 AM    VLDL, calculated 36 07/12/2017 10:18 AM    Triglyceride 178 07/12/2017 10:18 AM       Lab Results   Component Value Date/Time    ALT (SGPT) 18 07/12/2017 10:18 AM    AST (SGOT) 17 07/12/2017 10:18 AM    Alk. phosphatase 122 07/12/2017 10:18 AM    Bilirubin, total 0.3 07/12/2017 10:18 AM     GERD  On prilosec. Working well. Fibromyalgia  Remains on prozac, flexeril, gabapentin, tramadol. Her care has prev been Managed by Rheum Dr. Lady Larsen MD., rheumatologist at [facility]. Not getting her tramadol filled with them now though. Had visit early Oct 2017. Insomnia  As we are taking over her tramadol, we need to discuss ambien changing to alt agent due to drug int with tramadol. Prev starr well though.     PMH, SH, Medications/Allergies: reviewed, on chart.    ROS:  Constitutional: No fever, chills or weight loss  Respiratory: No cough, SOB   CV: No chest pain or Palpitations    Visit Vitals    /90 (BP 1 Location: Left arm, BP Patient Position: Sitting)    Pulse 67    Temp 98.9 °F (37.2 °C) (Oral)    Resp 20    Ht 5' 8\" (1.727 m)    Wt 189 lb 6.4 oz (85.9 kg)    SpO2 98%    BMI 28.8 kg/m2     Wt Readings from Last 3 Encounters:   12/18/17 189 lb 6.4 oz (85.9 kg)   10/11/17 195 lb (88.5 kg)   07/12/17 196 lb (88.9 kg)   -6#  BP Readings from Last 3 Encounters:   12/18/17 140/90   10/11/17 123/78   07/12/17 126/74     Physical Examination: General appearance - alert, well appearing, and in no distress  Mental status - alert, oriented to person, place, and time  Eyes - pupils equal and reactive, extraocular eye movements intact  ENT - bilateral external ears and nose normal. Normal lips  Neck - supple, no significant adenopathy, no thyromegaly or mass  Lymphatics - no palpable lymphadenopathy, no hepatosplenomegaly  Chest - clear to auscultation, no wheezes, rales or rhonchi, symmetric air entry  Heart - normal rate, regular rhythm, normal S1, S2, no murmurs, rubs, clicks or gallops  Extremities - peripheral pulses normal, no pedal edema, no clubbing or cyanosis    A/P:  Fibromyalgia  well controlled. con't tramadol #180/mo max, RF 2 for 3 mo fill. Discussed D/C ambien, and change to pamelor 25mg, but already on prozac 20mg, and combo of TCA, prozac, and tramadol may give rise to serotonin syndrome. Will keep same doses for now as long as starr well. Check routine UDS. Plan change flexeril to zanaflex when runs out to decrease risk of drug int with tramadol. Med use agreement signed today. HTN  well controlled. con't current tx. Check labs    HLD  well controlled. con't current tx. GERD  well controlled. con't current tx. OAB sx   Doing well on oxybutnin. con't.     Hep C screen  Check lab since drawing blood    F/U 3mo, can order refills on request.

## 2017-12-19 LAB
BUN SERPL-MCNC: 26 MG/DL (ref 8–27)
BUN/CREAT SERPL: 22 (ref 12–28)
CALCIUM SERPL-MCNC: 9.9 MG/DL (ref 8.7–10.3)
CHLORIDE SERPL-SCNC: 101 MMOL/L (ref 96–106)
CO2 SERPL-SCNC: 28 MMOL/L (ref 18–29)
CREAT SERPL-MCNC: 1.17 MG/DL (ref 0.57–1)
GLUCOSE SERPL-MCNC: 79 MG/DL (ref 65–99)
HCV AB S/CO SERPL IA: 0.1 S/CO RATIO (ref 0–0.9)
HCV AB SERPL QL IA: NORMAL
POTASSIUM SERPL-SCNC: 4.3 MMOL/L (ref 3.5–5.2)
SODIUM SERPL-SCNC: 142 MMOL/L (ref 134–144)

## 2017-12-23 LAB — DRUGS UR: NORMAL

## 2018-01-30 DIAGNOSIS — I10 ESSENTIAL HYPERTENSION: ICD-10-CM

## 2018-01-31 RX ORDER — METOPROLOL SUCCINATE 50 MG/1
TABLET, EXTENDED RELEASE ORAL
Qty: 135 TAB | Refills: 3 | Status: SHIPPED | OUTPATIENT
Start: 2018-01-31 | End: 2018-12-13 | Stop reason: SDUPTHER

## 2018-04-30 RX ORDER — POTASSIUM CHLORIDE 20 MEQ/1
TABLET, EXTENDED RELEASE ORAL
Qty: 90 TAB | Refills: 3 | Status: SHIPPED | OUTPATIENT
Start: 2018-04-30 | End: 2019-05-08 | Stop reason: SDUPTHER

## 2018-05-11 RX ORDER — MELOXICAM 15 MG/1
TABLET ORAL
Qty: 30 TAB | Refills: 2 | Status: SHIPPED | OUTPATIENT
Start: 2018-05-11 | End: 2018-08-17 | Stop reason: SDUPTHER

## 2018-05-25 RX ORDER — OMEPRAZOLE 40 MG/1
CAPSULE, DELAYED RELEASE ORAL
Qty: 90 CAP | Refills: 1 | Status: SHIPPED | OUTPATIENT
Start: 2018-05-25 | End: 2018-11-09 | Stop reason: SDUPTHER

## 2018-05-25 RX ORDER — AMLODIPINE BESYLATE 10 MG/1
TABLET ORAL
Qty: 90 TAB | Refills: 3 | Status: SHIPPED | OUTPATIENT
Start: 2018-05-25 | End: 2019-05-21 | Stop reason: SDUPTHER

## 2018-06-01 ENCOUNTER — OFFICE VISIT (OUTPATIENT)
Dept: FAMILY MEDICINE CLINIC | Age: 73
End: 2018-06-01

## 2018-06-01 VITALS
SYSTOLIC BLOOD PRESSURE: 122 MMHG | HEART RATE: 72 BPM | RESPIRATION RATE: 14 BRPM | WEIGHT: 199 LBS | BODY MASS INDEX: 30.16 KG/M2 | HEIGHT: 68 IN | DIASTOLIC BLOOD PRESSURE: 72 MMHG | TEMPERATURE: 98.3 F | OXYGEN SATURATION: 98 %

## 2018-06-01 DIAGNOSIS — E78.00 HIGH CHOLESTEROL: ICD-10-CM

## 2018-06-01 DIAGNOSIS — M17.12 PRIMARY OSTEOARTHRITIS OF LEFT KNEE: ICD-10-CM

## 2018-06-01 DIAGNOSIS — I10 ESSENTIAL HYPERTENSION: ICD-10-CM

## 2018-06-01 DIAGNOSIS — K21.9 GASTROESOPHAGEAL REFLUX DISEASE WITHOUT ESOPHAGITIS: ICD-10-CM

## 2018-06-01 DIAGNOSIS — Z51.81 THERAPEUTIC DRUG MONITORING: ICD-10-CM

## 2018-06-01 DIAGNOSIS — M75.101 ROTATOR CUFF TEAR ARTHROPATHY OF RIGHT SHOULDER: ICD-10-CM

## 2018-06-01 DIAGNOSIS — M12.811 ROTATOR CUFF TEAR ARTHROPATHY OF RIGHT SHOULDER: ICD-10-CM

## 2018-06-01 DIAGNOSIS — M79.7 FIBROMYALGIA: Primary | ICD-10-CM

## 2018-06-01 DIAGNOSIS — I10 ESSENTIAL HYPERTENSION, BENIGN: ICD-10-CM

## 2018-06-01 DIAGNOSIS — M47.816 LUMBAR SPONDYLOSIS: ICD-10-CM

## 2018-06-01 DIAGNOSIS — F51.01 PRIMARY INSOMNIA: ICD-10-CM

## 2018-06-01 RX ORDER — ZOLPIDEM TARTRATE 10 MG/1
TABLET ORAL
Qty: 30 TAB | Refills: 5 | Status: SHIPPED | OUTPATIENT
Start: 2018-06-01 | End: 2018-09-25 | Stop reason: SDUPTHER

## 2018-06-01 RX ORDER — TRAMADOL HYDROCHLORIDE 50 MG/1
TABLET ORAL
Qty: 180 TAB | Refills: 2 | Status: SHIPPED | OUTPATIENT
Start: 2018-06-01 | End: 2018-12-13 | Stop reason: SDUPTHER

## 2018-06-01 RX ORDER — TIZANIDINE 4 MG/1
4 TABLET ORAL
Qty: 90 TAB | Refills: 11 | Status: SHIPPED | OUTPATIENT
Start: 2018-06-01 | End: 2019-06-16 | Stop reason: SDUPTHER

## 2018-06-01 NOTE — MR AVS SNAPSHOT
[address] [place] [address],[address] [facility] [phone] Patient: Ellie Roth MRN: [id number] :1945 Visit Information Date & Time Provider Department Dept. Phone Encounter #  
 2018  2:40 PM Adelina Lowe MD [address] [place] [address] [id number] Follow-up Instructions Return in about 6 months (around 2018), or if symptoms worsen or fail to improve. Follow-up and Disposition History Upcoming Health Maintenance Date Due Influenza Age 5 to Adult 2018 MEDICARE YEARLY EXAM 10/12/2018 GLAUCOMA SCREENING Q2Y 2018 BREAST CANCER SCRN MAMMOGRAM 2019 COLONOSCOPY 2023 DTaP/Tdap/Td series (2 - Td) 10/11/2027 Allergies as of 2018  Review Complete On: 2018 By: Adelina Lowe MD  
 No Known Allergies Current Immunizations  Reviewed on 10/11/2017 Name Date Influenza High Dose Vaccine PF 10/11/2017 Influenza Vaccine 2015, 10/9/2014 12:00 AM, 10/2/2013 12:00 AM, 2012 12:00 AM  
 Pneumococcal Conjugate (PCV-13) 2017 Pneumococcal Polysaccharide (PPSV-23) 10/1/2010 12:00 AM  
 Pneumococcal Vaccine (Unspecified Type) 3/4/2015, 2015 12:00 AM  
 Zoster Vaccine, Live 2015 12:00 AM  
  
 Not reviewed this visit You Were Diagnosed With   
  
 Codes Comments Fibromyalgia    -  Primary ICD-10-CM: M79.7 ICD-9-CM: 729.1 Lumbar spondylosis     ICD-10-CM: M47.816 ICD-9-CM: 721.3 Essential hypertension, benign     ICD-10-CM: I10 
ICD-9-CM: 401.1 High cholesterol     ICD-10-CM: E78.00 ICD-9-CM: 272.0 Essential hypertension     ICD-10-CM: I10 
ICD-9-CM: 401.9 Gastroesophageal reflux disease without esophagitis     ICD-10-CM: K21.9 ICD-9-CM: 530.81 Therapeutic drug monitoring     ICD-10-CM: Z51.81 
ICD-9-CM: V58.83  Primary osteoarthritis of left knee     ICD-10-CM: M17.12 
ICD-9-CM: 715.16   
 Rotator cuff tear arthropathy of right shoulder     ICD-10-CM: M12.811 ICD-9-CM: 716.81 Primary insomnia     ICD-10-CM: F51.01 
ICD-9-CM: 307.42 Vitals BP Pulse Temp Resp Height(growth percentile) Weight(growth percentile) 122/72 (BP 1 Location: Right arm, BP Patient Position: Sitting) 72 98.3 °F (36.8 °C) (Oral) 14 5' 8\" (1.727 m) 199 lb (90.3 kg) SpO2 BMI OB Status Smoking Status 98% 30.26 kg/m2 Postmenopausal Never Smoker BMI and BSA Data Body Mass Index Body Surface Area  
 30.26 kg/m 2 2.08 m 2 Preferred Pharmacy Pharmacy Name Phone [facility], [address] [place] [address] [phone] Your Updated Medication List  
  
   
This list is accurate as of 6/1/18  3:40 PM.  Always use your most recent med list. amLODIPine 10 mg tablet Commonly known as:  NORVASC  
take 1 tablet by mouth once daily ARTIFICIAL TEARS([id number]) ophthalmic solution Generic drug:  dextran 70-hypromellose Administer 2 Drops to both eyes as needed. Indications: DRY EYE  
  
 ascorbic acid (vitamin C) 1,000 mg tablet Commonly known as:  VITAMIN C Take  by mouth. atorvastatin 40 mg tablet Commonly known as:  LIPITOR  
take 1 tablet by mouth once daily for heart and cholesterol CALCIUM 600 PO Take 600 mg by mouth daily. CENTRUM SILVER ULTRA WOMEN'S PO Take  by mouth. chlorthalidone 25 mg tablet Commonly known as:  HYGROTEN  
take 1 tablet by mouth once daily  
  
 cholecalciferol (VITAMIN D3) 5,000 unit Tab tablet Commonly known as:  VITAMIN D3 Take  by mouth daily. cyanocobalamin 1,000 mcg tablet Take 1,000 mcg by mouth daily. Indications: PREVENTION OF VITAMIN B12 DEFICIENCY  
  
 diphenhydrAMINE 25 mg capsule Commonly known as:  BENADRYL Take 50 mg by mouth nightly as needed. fish oil-dha-epa 1,200-144-216 mg Cap Take  by mouth. flaxseed 1,000 mg Cap Take 1,000 mg by mouth daily. FLUoxetine 20 mg capsule Commonly known as:  PROzac  
  
 gabapentin 300 mg capsule Commonly known as:  NEURONTIN  
take 1 capsule by mouth three times a day  Indications: nerve pain  
  
 losartan 50 mg tablet Commonly known as:  COZAAR Take 1 Tab by mouth daily. Indications: hypertension, pressure  
  
 meloxicam 15 mg tablet Commonly known as:  MOBIC  
take 1 tablet by mouth once daily with food  
  
 metoprolol succinate 50 mg XL tablet Commonly known as:  TOPROL-XL  
take 1 and 1/2 tablets by mouth daily MIRALAX 17 gram packet Generic drug:  polyethylene glycol Take 17 g by mouth daily. morinda citrifolia fruit 250 mg Cap Take 250 mg by mouth. omeprazole 40 mg capsule Commonly known as:  PRILOSEC  
take 1 capsule by mouth once daily for REFLUX  
  
 oxybutynin 5 mg tablet Commonly known as:  YEPPYIPG Take 1 Tab by mouth two (2) times a day. Indications: overactive bladder  
  
 potassium chloride 20 mEq tablet Commonly known as:  K-DUR, KLOR-CON  
take 1 tablet by mouth once daily tiZANidine 4 mg tablet Commonly known as:  Hosie Munda Take 1 Tab by mouth three (3) times daily as needed. Indications: spasm  
  
 traMADol 50 mg tablet Commonly known as:  ULTRAM  
2 tabs 3 times per day as needed for pain  Indications: FIBROMYALGIA, Pain  
  
 zolpidem 10 mg tablet Commonly known as:  AMBIEN  
take 1 tablet by mouth if needed for sleep Prescriptions Printed Refills  
 traMADol (ULTRAM) 50 mg tablet 2 Si tabs 3 times per day as needed for pain  Indications: FIBROMYALGIA, Pain Class: Print  
 zolpidem (AMBIEN) 10 mg tablet 5 Sig: take 1 tablet by mouth if needed for sleep Class: Print Prescriptions Sent to Pharmacy Refills  
 tiZANidine (ZANAFLEX) 4 mg tablet 11 Sig: Take 1 Tab by mouth three (3) times daily as needed. Indications: spasm  Class: Normal  
 [facility]acy: [facility] [address] [place] [address], [address] [place] [address] #: [phone] Route: Oral  
  
We Performed the Following [address] MONITORING [[id number] Custom] LIPID PANEL [29422 CPT(R)] METABOLIC PANEL, COMPREHENSIVE [29505 CPT(R)] Follow-up Instructions Return in about 6 months (around 12/1/2018), or if symptoms worsen or fail to improve. Patient Instructions Learning About Low-Carbohydrate Diets for Weight Loss What is a low-carbohydrate diet? Low-carb diets avoid foods that are high in carbohydrate. These high-carb foods include pasta, bread, rice, cereal, fruits, and starchy vegetables. Instead, these diets usually have you eat foods that are high in fat and protein. Many people lose weight quickly on a low-carb diet. But the early weight loss is water. People on this diet often gain the weight back after they start eating carbs again. Not all diet plans are safe or work well. A lot of the evidence shows that low-carb diets aren't healthy. That's because these diets often don't include healthy foods like fruits and vegetables. Losing weight safely means balancing protein, fat, and carbs with every meal and snack. And low-carb diets don't always provide the vitamins, minerals, and fiber you need. If you have a serious medical condition, talk to your doctor before you try any diet. These conditions include kidney disease, heart disease, type 2 diabetes, high cholesterol, and high blood pressure. If you are pregnant, it may not be safe for your baby if you are on a low-carb diet. How can you lose weight safely? You might have heard that a diet plan helped another person lose weight. But that doesn't mean that it will work for you. It is very hard to stay on a diet that includes lots of big changes in your eating habits.  If you want to get to a healthy weight and stay there, making healthy lifestyle changes will often work better than dieting. These steps can help. · Make a plan for change. Work with your doctor to create a plan that is right for you. · See a dietitian. He or she can show you how to make healthy changes in your eating habits. · Manage stress. If you have a lot of stress in your life, it can be hard to focus on making healthy changes to your daily habits. · Track your food and activity. You are likely to do better at losing weight if you keep track of what you eat and what you do. Follow-up care is a key part of your treatment and safety. Be sure to make and go to all appointments, and call your doctor if you are having problems. It's also a good idea to know your test results and keep a list of the medicines you take. Where can you learn more? Go to http://sandi-kendall.info/. Enter A121 in the search box to learn more about \"Learning About Low-Carbohydrate Diets for Weight Loss. \" Current as of: May 12, 2017 Content Version: 11.4 © 0101-1134 Healthwise[employer] Incorporated. Care instructions adapted under license by [employer] (which disclaims liability or warranty for this information). If you have questions about a medical condition or this instruction, always ask your healthcare professional. [address] any warranty or liability for your use of this information. Introducing [facility] & HEALTH SERVICES! [employer] introduces [employer] patient portal. Now you can access parts of your medical record, email your doctor's office, and request medication refills online. 1. In your internet browser, go to [email or web address] 2. Click on the First Time User? Click Here link in the Sign In box. You will see the New Member Sign Up page. 3. Enter your [employer] Access Code exactly as it appears below. You will not need to use this code after youve completed the sign-up process.  If you do not sign up before the expiration date, you must request a new code. · [employer] Access Code: [email or web address] Expires: 8/30/2018  2:18 PM 
 
4. Enter the last four digits of your Social Security Number (xxxx) and Date of Birth (mm/dd/yyyy) as indicated and click Submit. You will be taken to the next sign-up page. 5. Create a [employer] ID. This will be your [employer] login ID and cannot be changed, so think of one that is secure and easy to remember. 6. Create a [employer] password. You can change your password at any time. 7. Enter your Password Reset Question and Answer. This can be used at a later time if you forget your password. 8. Enter your e-mail address. You will receive e-mail notification when new information is available in [address] 9. Click Sign Up. You can now view and download portions of your medical record. 10. Click the Download Summary menu link to download a portable copy of your medical information. If you have questions, please visit the Frequently Asked Questions section of the [employer] website. Remember, [employer] is NOT to be used for urgent needs. For medical emergencies, dial 911. Now available from your iPhone and Android! Please provide this summary of care documentation to your next provider. Your primary care clinician is listed as Low Edgar. If you have any questions after today's visit, please call [phone].

## 2018-06-01 NOTE — PROGRESS NOTES
1. Have you been to the ER, urgent care clinic since your last visit? Hospitalized since your last visit? No    2. Have you seen or consulted any other health care providers outside of the [address] [place] [address] since your last visit? Include any pap smears or colon screening.  No

## 2018-06-01 NOTE — PATIENT INSTRUCTIONS
Learning About Low-Carbohydrate Diets for Weight Loss  What is a low-carbohydrate diet? Low-carb diets avoid foods that are high in carbohydrate. These high-carb foods include pasta, bread, rice, cereal, fruits, and starchy vegetables. Instead, these diets usually have you eat foods that are high in fat and protein. Many people lose weight quickly on a low-carb diet. But the early weight loss is water. People on this diet often gain the weight back after they start eating carbs again. Not all diet plans are safe or work well. A lot of the evidence shows that low-carb diets aren't healthy. That's because these diets often don't include healthy foods like fruits and vegetables. Losing weight safely means balancing protein, fat, and carbs with every meal and snack. And low-carb diets don't always provide the vitamins, minerals, and fiber you need. If you have a serious medical condition, talk to your doctor before you try any diet. These conditions include kidney disease, heart disease, type 2 diabetes, high cholesterol, and high blood pressure. If you are pregnant, it may not be safe for your baby if you are on a low-carb diet. How can you lose weight safely? You might have heard that a diet plan helped another person lose weight. But that doesn't mean that it will work for you. It is very hard to stay on a diet that includes lots of big changes in your eating habits. If you want to get to a healthy weight and stay there, making healthy lifestyle changes will often work better than dieting. These steps can help. · Make a plan for change. Work with your doctor to create a plan that is right for you. · See a dietitian. He or she can show you how to make healthy changes in your eating habits. · Manage stress. If you have a lot of stress in your life, it can be hard to focus on making healthy changes to your daily habits. · Track your food and activity.  You are likely to do better at losing weight if you keep track of what you eat and what you do. Follow-up care is a key part of your treatment and safety. Be sure to make and go to all appointments, and call your doctor if you are having problems. It's also a good idea to know your test results and keep a list of the medicines you take. Where can you learn more? Go to http://sandi-kendall.info/. Enter A121 in the search box to learn more about \"Learning About Low-Carbohydrate Diets for Weight Loss. \"  Current as of: May 12, 2017  Content Version: 11.4  © 0666-9857 [employer]wise, [employer]. Care instructions adapted under license by [employer] (which disclaims liability or warranty for this information). If you have questions about a medical condition or this instruction, always ask your healthcare professional. [address] any warranty or liability for your use of this information.

## 2018-06-01 NOTE — PROGRESS NOTES
Wen Wiggins is a 67 y.o. female presenting for/with:    Pain (Chronic) (refill tramadol)    HPI  Hypertension. Blood pressures have been stable/in goal. Management at last visit included continuing current regimen . Current regimen: angiotensin II receptor antagonist, calcium channel blocker and thiazide diuretic. Symptoms include no symptoms. Patient denies chest pain, palpitations, peripheral edema, headache. Lab review:   Lab Results   Component Value Date/Time    Sodium 142 12/18/2017 09:51 AM    Potassium 4.3 12/18/2017 09:51 AM    Chloride 101 12/18/2017 09:51 AM    CO2 28 12/18/2017 09:51 AM    Anion gap 9 05/05/2015 03:36 AM    Glucose 79 12/18/2017 09:51 AM    BUN 26 12/18/2017 09:51 AM    Creatinine 1.17 (H) 12/18/2017 09:51 AM    BUN/Creatinine ratio 22 12/18/2017 09:51 AM    GFR est AA 54 (L) 12/18/2017 09:51 AM    GFR est non-AA 47 (L) 12/18/2017 09:51 AM    Calcium 9.9 12/18/2017 09:51 AM     Hyperlipidemia. On lipitor 40. Linda well. No myalgias, arthralgias, unusual weakness. Lab Results   Component Value Date/Time    Cholesterol, total 182 07/12/2017 10:18 AM    HDL Cholesterol 58 07/12/2017 10:18 AM    LDL, calculated 88 07/12/2017 10:18 AM    VLDL, calculated 36 07/12/2017 10:18 AM    Triglyceride 178 (H) 07/12/2017 10:18 AM       Lab Results   Component Value Date/Time    ALT (SGPT) 18 07/12/2017 10:18 AM    AST (SGOT) 17 07/12/2017 10:18 AM    Alk. phosphatase 122 (H) 07/12/2017 10:18 AM    Bilirubin, total 0.3 07/12/2017 10:18 AM     GERD  On prilosec. Working well. Fibromyalgia  Remains on prozac, flexeril, gabapentin, tramadol. Her care has prev been Managed by Rheum Dr. Anamaria Chicas MD., rheumatologist at [facility]. Not getting her tramadol filled with them now though. Had visit early [address] [facility] [address] 2017. PMH, SH, Medications/Allergies: reviewed, on chart.     ROS:  Constitutional: No fever, chills or weight loss  Respiratory: No cough, SOB   CV: No chest pain or Palpitations    Visit Vitals  /72 (BP 1 Location: Right arm, BP Patient Position: Sitting)    Pulse 72    Temp 98.3 °F (36.8 °C) (Oral)    Resp 14    Ht 5' 8\" (1.727 m)    Wt 199 lb (90.3 kg)    SpO2 98%    BMI 30.26 kg/m2     Wt Readings from Last 3 Encounters:   06/01/18 199 lb (90.3 kg)   12/18/17 189 lb 6.4 oz (85.9 kg)   10/11/17 195 lb (88.5 kg)   +10#  BP Readings from Last 3 Encounters:   06/01/18 122/72   12/18/17 140/90   10/11/17 123/78     Physical Examination: General appearance - alert, well appearing, and in no distress  Mental status - alert, oriented to person, place, and time  Eyes - pupils equal and reactive, extraocular eye movements intact  ENT - bilateral external ears and nose normal. Normal lips  Neck - supple, no significant adenopathy, no thyromegaly or mass  Lymphatics - no palpable lymphadenopathy, no hepatosplenomegaly  Chest - clear to auscultation, no wheezes, rales or rhonchi, symmetric air entry  Heart - normal rate, regular rhythm, normal S1, S2, no murmurs, rubs, clicks or gallops  Extremities - peripheral pulses normal, no pedal edema, no clubbing or cyanosis    A/P:  Fibromyalgia  well controlled. con't tramadol #180/mo max, RF 2 for 3 mo fill. Will con't  ambien PRN insomnia, as already on prozac 20mg and tramadol, and a TCA may give rise to serotonin syndrome. Will keep same doses for now as long as starr well. Check routine UDS. Change flexeril to zanaflex. Med use agreement reviewed, on chart. HTN  well controlled. con't current tx. Check labs    HLD  well controlled. con't current tx. GERD  well controlled. con't current tx. OAB   Doing well on oxybutnin. Con't. Overweight  Work on low carb diet.     F/U 6mo, can order refills on request.

## 2018-06-02 LAB
ALBUMIN SERPL-MCNC: 4.2 G/DL (ref 3.5–4.8)
ALBUMIN/GLOB SERPL: 1.8 {RATIO} (ref 1.2–2.2)
ALP SERPL-CCNC: 97 IU/L (ref 39–117)
ALT SERPL-CCNC: 17 IU/L (ref 0–32)
AST SERPL-CCNC: 22 IU/L (ref 0–40)
BILIRUB SERPL-MCNC: 0.3 MG/DL (ref 0–1.2)
BUN SERPL-MCNC: 27 MG/DL (ref 8–27)
BUN/CREAT SERPL: 17 (ref 12–28)
CALCIUM SERPL-MCNC: 9.3 MG/DL (ref 8.7–10.3)
CHLORIDE SERPL-SCNC: 102 MMOL/L (ref 96–106)
CHOLEST SERPL-MCNC: 168 MG/DL (ref 100–199)
CO2 SERPL-SCNC: 26 MMOL/L (ref 18–29)
CREAT SERPL-MCNC: 1.6 MG/DL (ref 0.57–1)
GFR SERPLBLD CREATININE-BSD FMLA CKD-EPI: 32 ML/MIN/1.73
GFR SERPLBLD CREATININE-BSD FMLA CKD-EPI: 37 ML/MIN/1.73
GLOBULIN SER CALC-MCNC: 2.3 G/DL (ref 1.5–4.5)
GLUCOSE SERPL-MCNC: 97 MG/DL (ref 65–99)
HDLC SERPL-MCNC: 52 MG/DL
LDLC SERPL CALC-MCNC: 79 MG/DL (ref 0–99)
POTASSIUM SERPL-SCNC: 4.4 MMOL/L (ref 3.5–5.2)
PROT SERPL-MCNC: 6.5 G/DL (ref 6–8.5)
SODIUM SERPL-SCNC: 141 MMOL/L (ref 134–144)
TRIGL SERPL-MCNC: 184 MG/DL (ref 0–149)
VLDLC SERPL CALC-MCNC: 37 MG/DL (ref 5–40)

## 2018-06-06 LAB — DRUGS UR: NORMAL

## 2018-07-17 DIAGNOSIS — N32.81 OAB (OVERACTIVE BLADDER): ICD-10-CM

## 2018-07-18 RX ORDER — OXYBUTYNIN CHLORIDE 5 MG/1
TABLET ORAL
Qty: 60 TAB | Refills: 11 | Status: SHIPPED | OUTPATIENT
Start: 2018-07-18 | End: 2019-07-31 | Stop reason: SDUPTHER

## 2018-07-26 DIAGNOSIS — I10 ESSENTIAL HYPERTENSION, BENIGN: ICD-10-CM

## 2018-07-26 RX ORDER — LOSARTAN POTASSIUM 50 MG/1
TABLET ORAL
Qty: 90 TAB | Refills: 3 | Status: SHIPPED | OUTPATIENT
Start: 2018-07-26 | End: 2019-07-16 | Stop reason: SDUPTHER

## 2018-07-27 DIAGNOSIS — E78.00 HIGH CHOLESTEROL: ICD-10-CM

## 2018-07-27 RX ORDER — ATORVASTATIN CALCIUM 40 MG/1
TABLET, FILM COATED ORAL
Qty: 90 TAB | Refills: 3 | Status: SHIPPED | OUTPATIENT
Start: 2018-07-27 | End: 2019-07-24 | Stop reason: SDUPTHER

## 2018-08-17 RX ORDER — MELOXICAM 15 MG/1
TABLET ORAL
Qty: 30 TAB | Refills: 2 | Status: SHIPPED | OUTPATIENT
Start: 2018-08-17 | End: 2020-06-09 | Stop reason: SDUPTHER

## 2018-11-12 RX ORDER — OMEPRAZOLE 40 MG/1
CAPSULE, DELAYED RELEASE ORAL
Qty: 90 CAP | Refills: 1 | Status: SHIPPED | OUTPATIENT
Start: 2018-11-12 | End: 2019-05-22 | Stop reason: SDUPTHER

## 2019-03-27 DIAGNOSIS — I10 ESSENTIAL HYPERTENSION: ICD-10-CM

## 2019-03-27 RX ORDER — CHLORTHALIDONE 25 MG/1
TABLET ORAL
Qty: 90 TAB | Refills: 3 | Status: SHIPPED | OUTPATIENT
Start: 2019-03-27 | End: 2020-03-05

## 2019-05-08 RX ORDER — POTASSIUM CHLORIDE 20 MEQ/1
TABLET, EXTENDED RELEASE ORAL
Qty: 90 TAB | Refills: 3 | Status: SHIPPED | OUTPATIENT
Start: 2019-05-08 | End: 2020-05-18

## 2019-05-21 RX ORDER — AMLODIPINE BESYLATE 10 MG/1
TABLET ORAL
Qty: 90 TAB | Refills: 3 | Status: SHIPPED | OUTPATIENT
Start: 2019-05-21 | End: 2020-05-10

## 2019-05-22 RX ORDER — OMEPRAZOLE 40 MG/1
CAPSULE, DELAYED RELEASE ORAL
Qty: 90 CAP | Refills: 1 | Status: SHIPPED | OUTPATIENT
Start: 2019-05-22 | End: 2020-01-13

## 2019-06-16 DIAGNOSIS — M47.816 LUMBAR SPONDYLOSIS: ICD-10-CM

## 2019-06-18 RX ORDER — TIZANIDINE 4 MG/1
TABLET ORAL
Qty: 90 TAB | Refills: 11 | Status: SHIPPED | OUTPATIENT
Start: 2019-06-18 | End: 2020-06-09 | Stop reason: SDUPTHER

## 2019-07-16 DIAGNOSIS — I10 ESSENTIAL HYPERTENSION, BENIGN: ICD-10-CM

## 2019-07-16 RX ORDER — LOSARTAN POTASSIUM 50 MG/1
TABLET ORAL
Qty: 90 TAB | Refills: 3 | Status: SHIPPED | OUTPATIENT
Start: 2019-07-16 | End: 2020-05-10

## 2019-07-24 PROBLEM — N18.30 CKD (CHRONIC KIDNEY DISEASE) STAGE 3, GFR 30-59 ML/MIN (HCC): Status: ACTIVE | Noted: 2019-07-24

## 2019-08-08 PROBLEM — Z12.11 ENCOUNTER FOR COLONOSCOPY DUE TO HISTORY OF ADENOMATOUS COLONIC POLYPS: Status: ACTIVE | Noted: 2018-07-25

## 2019-08-08 PROBLEM — Z86.010 ENCOUNTER FOR COLONOSCOPY DUE TO HISTORY OF ADENOMATOUS COLONIC POLYPS: Status: ACTIVE | Noted: 2018-07-25

## 2019-09-19 PROBLEM — Z12.11 ENCOUNTER FOR COLONOSCOPY DUE TO HISTORY OF ADENOMATOUS COLONIC POLYPS: Status: RESOLVED | Noted: 2018-07-25 | Resolved: 2019-09-19

## 2019-09-19 PROBLEM — Z86.010 ENCOUNTER FOR COLONOSCOPY DUE TO HISTORY OF ADENOMATOUS COLONIC POLYPS: Status: RESOLVED | Noted: 2018-07-25 | Resolved: 2019-09-19

## 2020-01-13 RX ORDER — OMEPRAZOLE 40 MG/1
CAPSULE, DELAYED RELEASE ORAL
Qty: 90 CAP | Refills: 1 | Status: SHIPPED | OUTPATIENT
Start: 2020-01-13 | End: 2020-11-17 | Stop reason: SDUPTHER

## 2020-11-23 ENCOUNTER — TRANSCRIBE ORDER (OUTPATIENT)
Dept: FAMILY MEDICINE CLINIC | Age: 75
End: 2020-11-23

## 2020-12-04 ENCOUNTER — TRANSCRIBE ORDER (OUTPATIENT)
Dept: FAMILY MEDICINE CLINIC | Age: 75
End: 2020-12-04

## 2021-03-09 ENCOUNTER — TELEPHONE (OUTPATIENT)
Dept: FAMILY MEDICINE CLINIC | Age: 76
End: 2021-03-09

## 2021-03-09 PROBLEM — I10 HYPERTENSION: Status: ACTIVE | Noted: 2020-10-16

## 2021-03-09 PROBLEM — S72.002A CLOSED FRACTURE OF NECK OF LEFT FEMUR (HCC): Status: ACTIVE | Noted: 2020-10-16

## 2021-03-09 RX ORDER — DICLOFENAC SODIUM 75 MG/1
75 TABLET, DELAYED RELEASE ORAL 2 TIMES DAILY
COMMUNITY
Start: 2021-03-02 | End: 2021-03-10 | Stop reason: ALTCHOICE

## 2021-03-09 NOTE — TELEPHONE ENCOUNTER
Pt is on zolpidem/ ambien to help with sleep. Not a great idea to combine tramadol with zolpidem. AS pt just requested refill on that, she is certainly taking that. She should use the diclofenac for pain relief for now.

## 2021-03-10 ENCOUNTER — VIRTUAL VISIT (OUTPATIENT)
Dept: FAMILY MEDICINE CLINIC | Age: 76
End: 2021-03-10
Payer: MEDICARE

## 2021-03-10 DIAGNOSIS — M47.816 LUMBAR SPONDYLOSIS: ICD-10-CM

## 2021-03-10 DIAGNOSIS — M17.12 PRIMARY OSTEOARTHRITIS OF LEFT KNEE: ICD-10-CM

## 2021-03-10 DIAGNOSIS — M75.101 ROTATOR CUFF TEAR ARTHROPATHY OF RIGHT SHOULDER: ICD-10-CM

## 2021-03-10 DIAGNOSIS — N18.30 STAGE 3 CHRONIC KIDNEY DISEASE, UNSPECIFIED WHETHER STAGE 3A OR 3B CKD (HCC): Primary | ICD-10-CM

## 2021-03-10 DIAGNOSIS — I10 ESSENTIAL HYPERTENSION: ICD-10-CM

## 2021-03-10 DIAGNOSIS — M12.811 ROTATOR CUFF TEAR ARTHROPATHY OF RIGHT SHOULDER: ICD-10-CM

## 2021-03-10 DIAGNOSIS — M79.7 FIBROMYALGIA: ICD-10-CM

## 2021-03-10 PROCEDURE — G2025 DIS SITE TELE SVCS RHC/FQHC: HCPCS | Performed by: FAMILY MEDICINE

## 2021-03-10 RX ORDER — MELOXICAM 15 MG/1
TABLET ORAL
Qty: 90 TAB | Refills: 0
Start: 2021-03-10 | End: 2021-05-17

## 2021-03-10 RX ORDER — TRAMADOL HYDROCHLORIDE 50 MG/1
50 TABLET ORAL
Qty: 200 TAB | Refills: 0 | Status: SHIPPED | OUTPATIENT
Start: 2021-03-10 | End: 2021-06-08

## 2021-03-10 NOTE — PROGRESS NOTES
Chief Complaint   Patient presents with    Medication Evaluation     requesting tramdol    Other     pain level 6/10     1. Have you been to the ER, urgent care clinic since your last visit? Hospitalized since your last visit? No    2. Have you seen or consulted any other health care providers outside of the [address] [place] [address] since your last visit? Include any pap smears or colon screening. No    Identified pt with two pt identifiers(name and ). Reviewed record in preparation for visit and have obtained necessary documentation.     Symptom review: covid 19    NO  Fever   NO  Shaking chills  NO  Cough  NO Headaches  NO  Body aches  NO  Coughing up blood  NO  Chest congestion  NO  Chest pain  NO  Shortness of breath  NO  Profound Loss of smell/taste  NO  Nausea/Vomiting   NO  Loose stool/Diarrhea  NO  any skin issues    Patient Risk Factors Reviewed as follows:    NO  have you or any one in your home have had or has a pending covid test  NO  have you been in Close contact with confirmed COVID19 patient   NO  History of recent travel to affected geographical areas within the past 14 days  NO  COPD  NO  Active Cancer/Leukemia/Lymphoma/Chemotherapy  NO  Oral steroid use  NO  Pregnant  NO  Diabetes Mellitus  NO  Heart disease  NO  Asthma  NO Health care worker at home  NO Health care worker  NO Is there a Pregnant Woman in the home  NO Dialysis pt in the home   NO a large number of people living in the home

## 2021-03-10 NOTE — ACP (ADVANCE CARE PLANNING)
Advance Care Planning     Advance Care Planning (ACP) Physician/NP/PA Conversation      Date of Conversation: 3/10/2021  Conducted with: Patient with Decision Making Capacity    Healthcare Decision Maker:     Click here to complete [address] including [address] Relationship (ie \"Primary\")  Today we documented Decision Maker(s) consistent with ACP documents on file.     See scanned ACP

## 2021-03-10 NOTE — PROGRESS NOTES
Diane Slade is a 76 y.o. female who was seen by synchronous (real-time) audio technology on 3/10/2021. Pt was seen at home. Provider was at the office. No other participants in this encounter. 1. Have you been to the ER, urgent care clinic since your last visit? Hospitalized since your last visit? Yes, left hip injury  2. Have you seen or consulted any other health care providers outside of the [address] [place] [address] since your last visit? Include any pap smears or colon screening. No      Subjective:   Medication Evaluation (requesting tramdol) and Other (pain level 6/10)    L hip fx s/p THR with Dr. Idalmis Malone  Has been doing ok lately. Hip fx has healed well. Current pain relief with Tramadol. Doing well overall. Ortho is managing pt's pain, she is off norco and back on tramadol from ortho in Jan 2021. Of note, has been taking both diclofenac and meloxicam due to a misunderstanding. Hypertension and CKD3. Blood pressures have been stable/in goal. Management at last visit included continuing current regimen . Current regimen: angiotensin II receptor antagonist, calcium channel blocker and thiazide diuretic. Symptoms include no symptoms. Patient denies chest pain, palpitations, peripheral edema, headache. Labs at with stable sl decrease in GFRaa and nl electrolytes. Lab review:   Lab Results   Component Value Date/Time    Sodium 137 03/09/2021 09:12 AM    Potassium 3.8 03/09/2021 09:12 AM    Chloride 102 03/09/2021 09:12 AM    CO2 30 03/09/2021 09:12 AM    Anion gap 5 03/09/2021 09:12 AM    Glucose 81 03/09/2021 09:12 AM    BUN 24 (H) 03/09/2021 09:12 AM    Creatinine 1.32 (H) 03/09/2021 09:12 AM    BUN/Creatinine ratio 18 03/09/2021 09:12 AM    GFR est AA 48 (L) 03/09/2021 09:12 AM    GFR est non-AA 39 (L) 03/09/2021 09:12 AM    Calcium 9.8 03/09/2021 09:12 AM     Hyperlipidemia. On lipitor 40. Linda well. No myalgias, arthralgias, unusual weakness.   Lab Results   Component Value Date/Time Cholesterol, total 164 11/04/2020 10:38 AM    HDL Cholesterol 60 11/04/2020 10:38 AM    LDL, calculated 82 11/04/2020 10:38 AM    LDL, calculated 85 07/24/2019 10:58 AM    VLDL, calculated 22 11/04/2020 10:38 AM    VLDL, calculated 16 07/24/2019 10:58 AM    Triglyceride 126 11/04/2020 10:38 AM     [facility]  Lab Results   Component Value Date/Time    ALT (SGPT) 20 07/24/2019 10:58 AM    Alk. phosphatase 69 07/24/2019 10:58 AM    Bilirubin, total 0.5 07/24/2019 10:58 AM     GERD  On prilosec. Working well. Fibromyalgia  Remains on prozac, flexeril, gabapentin, mobic. Back on tramadol from Ortho, no longer on Jackpot 5's. Her care has prev been Managed by Rheum Dr. Dave Fu MD., rheumatologist at [facility], and Ortho at Dr. Beatriz Gamino [facility] in [address] [place] [address]. PMH, SH, Medications/Allergies: reviewed, on chart. Current Outpatient Medications   Medication Sig    diclofenac EC (VOLTAREN) 75 mg EC tablet Take 75 mg by mouth two (2) times a day.  zolpidem (AMBIEN) 10 mg tablet TAKE 1 TABLET BY MOUTH EVERY NIGHT. MAX DAILY AMOUNT: 10 MG    cetirizine (ZYRTEC) 5 mg tablet Take 5 mg by mouth daily.  omeprazole (PRILOSEC) 40 mg capsule Take 1 Cap by mouth daily.  losartan (COZAAR) 50 mg tablet TAKE 1 TABLET BY MOUTH ONCE DAILY for pressure    amLODIPine (NORVASC) 10 mg tablet TAKE 1 TABLET BY MOUTH EVERY DAY for pressure    metoprolol succinate (TOPROL-XL) 50 mg XL tablet TAKE 1 AND 1/2 TABLETS BY MOUTH EVERY DAY FOR BLOOD PRESSURE    vitamin E (AQUA GEMS) 400 unit capsule Take 400 Units by mouth daily.  aspirin 81 mg chewable tablet Take 81 mg by mouth daily.     chlorthalidone (HYGROTEN) 25 mg tablet TAKE 1 TABLET BY MOUTH ONCE DAILY for pressure    oxybutynin (DITROPAN) 5 mg tablet take 1 tablet by mouth twice a day for bladder    gabapentin (NEURONTIN) 300 mg capsule take 1 capsule by mouth three times a day  Indications: nerve pain    atorvastatin (LIPITOR) 40 mg tablet take 1 tablet by mouth once daily for heart and cholesterol    tiZANidine (ZANAFLEX) 4 mg tablet take 1 tablet by mouth three times a day if needed for SPASM    potassium chloride (K-DUR, KLOR-CON) 20 mEq tablet TAKE 1 TABLET BY MOUTH EVERY DAY    FLUoxetine (PROZAC) 20 mg capsule     cholecalciferol, VITAMIN D3, (VITAMIN D3) 5,000 unit tab tablet Take  by mouth daily.  flaxseed 1,000 mg cap Take 1,000 mg by mouth daily.  morinda citrifolia fruit 250 mg cap Take 250 mg by mouth.  MV,FE,MIN/LUTEIN (CENTRUM SILVER ULTRA WOMEN'S PO) Take  by mouth.  CALCIUM CARBONATE (CALCIUM 600 PO) Take 600 mg by mouth daily.  fish oil-dha-epa 1,200-144-216 mg cap Take  by mouth.  ascorbic acid, vitamin C, (VITAMIN C) 1,000 mg tablet Take  by mouth.  polyethylene glycol (MIRALAX) 17 gram packet Take 17 g by mouth daily.  diphenhydrAMINE (BENADRYL) 25 mg capsule Take 50 mg by mouth nightly as needed.  cyanocobalamin 1,000 mcg tablet Take 1,000 mcg by mouth daily. Indications: PREVENTION OF VITAMIN B12 DEFICIENCY    dextran 70-hypromellose (ARTIFICIAL TEARS) ophthalmic solution Administer 2 Drops to both eyes as needed. Indications: DRY EYE     No current facility-administered medications for this visit.        No Known Allergies    ROS:  Constitutional: No fever, chills or abnormal weight loss  Respiratory: No cough, SOB   CV: No chest pain or Palpitations    VS review:  Last Filed Vital Signs    Vital Sign Reading Time Taken Comments   Blood Pressure 135/86 01/19/2021 8:50 AM EST     Pulse 59 01/19/2021 8:50 AM EST     Temperature 36.8 °C (98.2 °F) 12/31/2020 9:19 AM EST     Respiratory Rate 16 01/19/2021 8:50 AM EST     Oxygen Saturation 99% 12/31/2020 9:19 AM EST     Inhaled Oxygen Concentration - -     Weight 64.5 kg (142 lb 3.2 oz) 01/19/2021 8:50 AM EST     Height 170.2 cm (5' 7\") 12/13/2020 4:17 PM EST     Body Mass Index 22.27 12/13/2020 4:17 PM EST          Wt Readings from Last 3 Encounters: 08/08/19 167 lb (75.8 kg)   07/24/19 169 lb (76.7 kg)   12/13/18 176 lb (79.8 kg)     131/86  BP Readings from Last 3 Encounters:   08/08/19 130/82   07/24/19 130/70   12/13/18 112/72       Objective:     General: alert, cooperative, no distress   Mental  status: mental status: alert, oriented to person, place, and time, normal mood, behavior, speech, dress, motor activity, and thought processes   Resp: resp: normal effort and no respiratory distress   Neuro: neuro: no gross deficits           Assessment & Plan:   Hx L Hip replacement  Healed well. Seeing Dr. Logan [facility]iel clinic in [facility], but would like us to manage her pain meds now. Hold the diclofenac, just use the meloxicam 15mg/d, and resume the tramadol 50mg BID-TID, #200/ 3mo     Anemia  CBC showed HGB and iron recovering ok since hosp. con't iron pills and plan recheck in 3mo. Fibromyalgia  well controlled. R/S tramadol as above, should help. Con't zanaflex. HTN/CKD3   Stable on labs at [facility], but not great. Holding the diclofenac should help. HLD  well controlled. con't current tx. Labs due fall 2021, plan adjust PRN. GERD  well controlled. con't current tx. OAB   Doing well on oxybutnin. Con't. Overweight  Doing great! Resolved now. Con't to work on diet. F/U 3mo. Time-based coding, delete if not needed: I spent at least 15 minutes with this established patient, and >50% of the time was spent counseling and/or coordinating care regarding care plan and expected course  Kristin Fernández MD    Due to this being a TeleHealth evaluation, many elements of the physical examination are unable to be assessed. We discussed the expected course, resolution and complications of the diagnosis(es) in detail. Medication risks, benefits, costs, interactions, and alternatives were discussed as indicated. I advised her to contact the office if her condition worsens, changes or fails to improve as anticipated.  She expressed understanding with the diagnosis(es) and plan. Pursuant to the emergency declaration under the [facility], [facility] waiver authority and the [employer] Act, this Virtual  Visit was conducted, with patient's consent, to reduce the patient's risk of exposure to COVID-19 and provide continuity of care for an established patient. Services were provided through audio synchronous discussion virtually to substitute for in-person clinic visit. Consent:  She and/or her healthcare decision maker is aware that this patient-initiated Telehealth encounter is a billable service, with coverage as determined by her insurance carrier. She is aware that she may receive a bill and has provided verbal consent to proceed.     CPT Codes 34584-51332 for Established Patients may apply to this Telehealth Visit    Symptom review: Covid     NO  Fever   NO  Shaking chills  NO  Cough  NO Headaches  NO  Body aches  NO  Coughing up blood  NO  Chest congestion  NO  Chest pain  NO  Shortness of breath  NO  Profound Loss of smell/taste  NO  Nausea/Vomiting   NO  Loose stool/Diarrhea  NO  any skin issues    Patient Risk Factors Reviewed as follows:    NO  have you or any one in your home have had or has a pending covid test  NO  have you been in Close contact with confirmed COVID19 patient   NO  History of recent travel to affected geographical areas within the past 14 days  NO  COPD  NO  Active Cancer/Leukemia/Lymphoma/Chemotherapy  NO  Oral steroid use  NO  Pregnant  NO  Diabetes Mellitus  NO  Heart disease  NO  Asthma  NO Health care worker at home  NO Health care worker  NO Is there a Pregnant Woman in the home  NO Dialysis pt in the home   NO a large number of people living in the home

## 2021-03-29 ENCOUNTER — DOCUMENTATION ONLY (OUTPATIENT)
Dept: FAMILY MEDICINE CLINIC | Age: 76
End: 2021-03-29

## 2021-03-29 NOTE — PROGRESS NOTES
Unable to reach by phone on both numbers in her chart  3/29/2021 11:31 AM Addendum:    Lukasz Nam LPN

## 2021-03-30 ENCOUNTER — TELEPHONE (OUTPATIENT)
Dept: FAMILY MEDICINE CLINIC | Age: 76
End: 2021-03-30

## 2021-05-17 DIAGNOSIS — I10 ESSENTIAL HYPERTENSION: ICD-10-CM

## 2021-05-17 RX ORDER — AMLODIPINE BESYLATE 10 MG/1
TABLET ORAL
Qty: 90 TAB | Refills: 1 | Status: SHIPPED | OUTPATIENT
Start: 2021-05-17 | End: 2021-11-05

## 2021-06-25 ENCOUNTER — OFFICE VISIT (OUTPATIENT)
Dept: FAMILY MEDICINE CLINIC | Age: 76
End: 2021-06-25
Payer: MEDICARE

## 2021-06-25 VITALS
DIASTOLIC BLOOD PRESSURE: 80 MMHG | BODY MASS INDEX: 21.95 KG/M2 | WEIGHT: 144.8 LBS | HEART RATE: 59 BPM | OXYGEN SATURATION: 95 % | RESPIRATION RATE: 20 BRPM | SYSTOLIC BLOOD PRESSURE: 118 MMHG | TEMPERATURE: 97.7 F | HEIGHT: 68 IN

## 2021-06-25 DIAGNOSIS — N18.30 STAGE 3 CHRONIC KIDNEY DISEASE, UNSPECIFIED WHETHER STAGE 3A OR 3B CKD (HCC): ICD-10-CM

## 2021-06-25 DIAGNOSIS — Z00.00 MEDICARE ANNUAL WELLNESS VISIT, SUBSEQUENT: Primary | ICD-10-CM

## 2021-06-25 DIAGNOSIS — M47.816 LUMBAR SPONDYLOSIS: ICD-10-CM

## 2021-06-25 DIAGNOSIS — Z86.2 HISTORY OF ANEMIA: ICD-10-CM

## 2021-06-25 DIAGNOSIS — M75.101 ROTATOR CUFF TEAR ARTHROPATHY OF RIGHT SHOULDER: ICD-10-CM

## 2021-06-25 DIAGNOSIS — M17.12 PRIMARY OSTEOARTHRITIS OF LEFT KNEE: ICD-10-CM

## 2021-06-25 DIAGNOSIS — Z13.31 SCREENING FOR DEPRESSION: ICD-10-CM

## 2021-06-25 DIAGNOSIS — Z96.642 STATUS POST LEFT HIP REPLACEMENT: ICD-10-CM

## 2021-06-25 DIAGNOSIS — M12.811 ROTATOR CUFF TEAR ARTHROPATHY OF RIGHT SHOULDER: ICD-10-CM

## 2021-06-25 DIAGNOSIS — E78.00 HIGH CHOLESTEROL: ICD-10-CM

## 2021-06-25 DIAGNOSIS — M79.7 FIBROMYALGIA: ICD-10-CM

## 2021-06-25 DIAGNOSIS — I10 ESSENTIAL HYPERTENSION: ICD-10-CM

## 2021-06-25 DIAGNOSIS — Z13.39 SCREENING FOR ALCOHOLISM: ICD-10-CM

## 2021-06-25 PROCEDURE — G8399 PT W/DXA RESULTS DOCUMENT: HCPCS | Performed by: FAMILY MEDICINE

## 2021-06-25 PROCEDURE — 99214 OFFICE O/P EST MOD 30 MIN: CPT | Performed by: FAMILY MEDICINE

## 2021-06-25 PROCEDURE — G8427 DOCREV CUR MEDS BY ELIG CLIN: HCPCS | Performed by: FAMILY MEDICINE

## 2021-06-25 PROCEDURE — G0439 PPPS, SUBSEQ VISIT: HCPCS | Performed by: FAMILY MEDICINE

## 2021-06-25 PROCEDURE — G8754 DIAS BP LESS 90: HCPCS | Performed by: FAMILY MEDICINE

## 2021-06-25 PROCEDURE — G8536 NO DOC ELDER MAL SCRN: HCPCS | Performed by: FAMILY MEDICINE

## 2021-06-25 PROCEDURE — 3017F COLORECTAL CA SCREEN DOC REV: CPT | Performed by: FAMILY MEDICINE

## 2021-06-25 PROCEDURE — 1100F PTFALLS ASSESS-DOCD GE2>/YR: CPT | Performed by: FAMILY MEDICINE

## 2021-06-25 PROCEDURE — G0444 DEPRESSION SCREEN ANNUAL: HCPCS | Performed by: FAMILY MEDICINE

## 2021-06-25 PROCEDURE — 1090F PRES/ABSN URINE INCON ASSESS: CPT | Performed by: FAMILY MEDICINE

## 2021-06-25 PROCEDURE — G8752 SYS BP LESS 140: HCPCS | Performed by: FAMILY MEDICINE

## 2021-06-25 PROCEDURE — G8510 SCR DEP NEG, NO PLAN REQD: HCPCS | Performed by: FAMILY MEDICINE

## 2021-06-25 PROCEDURE — G8420 CALC BMI NORM PARAMETERS: HCPCS | Performed by: FAMILY MEDICINE

## 2021-06-25 PROCEDURE — 3288F FALL RISK ASSESSMENT DOCD: CPT | Performed by: FAMILY MEDICINE

## 2021-06-25 RX ORDER — ATORVASTATIN CALCIUM 40 MG/1
TABLET, FILM COATED ORAL
Qty: 90 TABLET | Refills: 3 | Status: SHIPPED | OUTPATIENT
Start: 2021-06-25 | End: 2022-07-05

## 2021-06-25 RX ORDER — TRAMADOL HYDROCHLORIDE 50 MG/1
50 TABLET ORAL
Qty: 100 TABLET | Refills: 0 | Status: SHIPPED | OUTPATIENT
Start: 2021-06-25 | End: 2021-10-04 | Stop reason: SDUPTHER

## 2021-06-25 RX ORDER — MELOXICAM 15 MG/1
TABLET ORAL
Qty: 90 TABLET | Refills: 3 | Status: SHIPPED | OUTPATIENT
Start: 2021-06-25 | End: 2022-09-06

## 2021-06-25 RX ORDER — CHLORTHALIDONE 25 MG/1
TABLET ORAL
Qty: 90 TABLET | Refills: 3 | Status: SHIPPED | OUTPATIENT
Start: 2021-06-25 | End: 2022-09-20 | Stop reason: SDUPTHER

## 2021-06-25 NOTE — PATIENT INSTRUCTIONS

## 2021-06-25 NOTE — PROGRESS NOTES
Elyssa Franz is a 76 y.o. female     Subjective: Annual Wellness Visit    L hip fx s/p THR with Dr. Tanya Marr  Has been doing ok lately. Hip fx has healed well. Current pain relief with Tramadol. Doing well overall. Ortho is managing pt's pain, she is off norco and back on tramadol from ortho in Jan 2021. Only taking meloxicam now for NSAID, starr well. Hypertension and CKD3. Blood pressures have been stable/in goal. Management at last visit included continuing current regimen . Current regimen: angiotensin II receptor antagonist, calcium channel blocker and thiazide diuretic. Symptoms include no symptoms. Patient denies chest pain, palpitations, peripheral edema, headache. Labs at with stable sl decrease in GFRaa and nl electrolytes. Lab review:   Lab Results   Component Value Date/Time    Sodium 137 03/09/2021 09:12 AM    Potassium 3.8 03/09/2021 09:12 AM    Chloride 102 03/09/2021 09:12 AM    CO2 30 03/09/2021 09:12 AM    Anion gap 5 03/09/2021 09:12 AM    Glucose 81 03/09/2021 09:12 AM    BUN 24 (H) 03/09/2021 09:12 AM    Creatinine 1.32 (H) 03/09/2021 09:12 AM    BUN/Creatinine ratio 18 03/09/2021 09:12 AM    GFR est AA 48 (L) 03/09/2021 09:12 AM    GFR est non-AA 39 (L) 03/09/2021 09:12 AM    Calcium 9.8 03/09/2021 09:12 AM     Hyperlipidemia. On lipitor 40. Starr well. No myalgias, arthralgias, unusual weakness. Lab Results   Component Value Date/Time    Cholesterol, total 164 11/04/2020 10:38 AM    HDL Cholesterol 60 11/04/2020 10:38 AM    LDL, calculated 82 11/04/2020 10:38 AM    LDL, calculated 85 07/24/2019 10:58 AM    VLDL, calculated 22 11/04/2020 10:38 AM    VLDL, calculated 16 07/24/2019 10:58 AM    Triglyceride 126 11/04/2020 10:38 AM     [facility]'s good at [facility] 12/10/2020  Lab Results   Component Value Date/Time    ALT (SGPT) 20 07/24/2019 10:58 AM    Alk. phosphatase 69 07/24/2019 10:58 AM    Bilirubin, total 0.5 07/24/2019 10:58 AM     GERD  On prilosec.  Working well.    Fibromyalgia  Remains on prozac, flexeril, gabapentin, mobic. Back on tramadol from Ortho, no longer on [place] 5's. Her care has prev been Managed by Rheum Dr. Anyi Gómez MD., rheumatologist at [facility], and Ortho at Dr. Radha scott in [address] [facility]. PMH, SH, Medications/Allergies: reviewed, on chart. Current Outpatient Medications   Medication Sig    potassium chloride (K-DUR, KLOR-CON) 20 mEq tablet TAKE 1 TABLET BY MOUTH EVERY DAY    meloxicam (MOBIC) 15 mg tablet TAKE 1 TABLET BY MOUTH EVERY DAY WITH FOOD AS NEEDED FOR PAIN    amLODIPine (NORVASC) 10 mg tablet TAKE 1 TABLET BY MOUTH EVERY DAY for pressure    zolpidem (AMBIEN) 10 mg tablet TAKE 1 TABLET BY MOUTH EVERY NIGHT. MAX DAILY AMOUNT: 10 MG    cetirizine (ZYRTEC) 5 mg tablet Take 5 mg by mouth daily.  omeprazole (PRILOSEC) 40 mg capsule Take 1 Cap by mouth daily.  losartan (COZAAR) 50 mg tablet TAKE 1 TABLET BY MOUTH ONCE DAILY for pressure    metoprolol succinate (TOPROL-XL) 50 mg XL tablet TAKE 1 AND 1/2 TABLETS BY MOUTH EVERY DAY FOR BLOOD PRESSURE    vitamin E (AQUA GEMS) 400 unit capsule Take 400 Units by mouth daily.  aspirin 81 mg chewable tablet Take 81 mg by mouth daily.  chlorthalidone (HYGROTEN) 25 mg tablet TAKE 1 TABLET BY MOUTH ONCE DAILY for pressure    oxybutynin (DITROPAN) 5 mg tablet take 1 tablet by mouth twice a day for bladder    gabapentin (NEURONTIN) 300 mg capsule take 1 capsule by mouth three times a day  Indications: nerve pain    atorvastatin (LIPITOR) 40 mg tablet take 1 tablet by mouth once daily for heart and cholesterol    tiZANidine (ZANAFLEX) 4 mg tablet take 1 tablet by mouth three times a day if needed for SPASM    FLUoxetine (PROZAC) 20 mg capsule     cholecalciferol, VITAMIN D3, (VITAMIN D3) 5,000 unit tab tablet Take  by mouth daily.  flaxseed 1,000 mg cap Take 1,000 mg by mouth daily.  morinda citrifolia fruit 250 mg cap Take 250 mg by mouth.     MV,FE,MIN/LUTEIN (CENTRUM SILVER ULTRA WOMEN'S PO) Take  by mouth.  CALCIUM CARBONATE (CALCIUM 600 PO) Take 600 mg by mouth daily.  fish oil-dha-epa 1,200-144-216 mg cap Take  by mouth.  ascorbic acid, vitamin C, (VITAMIN C) 1,000 mg tablet Take  by mouth.  polyethylene glycol (MIRALAX) 17 gram packet Take 17 g by mouth daily.  diphenhydrAMINE (BENADRYL) 25 mg capsule Take 50 mg by mouth nightly as needed.  cyanocobalamin 1,000 mcg tablet Take 1,000 mcg by mouth daily. Indications: PREVENTION OF VITAMIN B12 DEFICIENCY    dextran 70-hypromellose (ARTIFICIAL TEARS) ophthalmic solution Administer 2 Drops to both eyes as needed. Indications: DRY EYE     No current facility-administered medications for this visit.       No Known Allergies    ROS:  Constitutional: No fever, chills or abnormal weight loss  Respiratory: No cough, SOB   CV: No chest pain or Palpitations    VS review:  Visit Vitals  /80 (BP 1 Location: Left arm, BP Patient Position: Sitting, BP Cuff Size: Adult long)   Pulse (!) 59   Temp 97.7 °F (36.5 °C) (Temporal)   Resp 20   Ht 5' 8\" (1.727 m)   Wt 144 lb 12.8 oz (65.7 kg)   SpO2 95%   BMI 22.02 kg/m²         Vital Sign Reading Time Taken Comments   Blood Pressure 135/86 01/19/2021 8:50 AM EST     Pulse 59 01/19/2021 8:50 AM EST     Temperature 36.8 °C (98.2 °F) 12/31/2020 9:19 AM EST     Respiratory Rate 16 01/19/2021 8:50 AM EST     Oxygen Saturation 99% 12/31/2020 9:19 AM EST     Inhaled Oxygen Concentration - -     Weight 64.5 kg (142 lb 3.2 oz) 01/19/2021 8:50 AM EST     Height 170.2 cm (5' 7\") 12/13/2020 4:17 PM EST     Body Mass Index 22.27 12/13/2020 4:17 PM EST          Wt Readings from Last 3 Encounters:   06/25/21 144 lb 12.8 oz (65.7 kg)   08/08/19 167 lb (75.8 kg)   07/24/19 169 lb (76.7 kg)     131/86  BP Readings from Last 3 Encounters:   06/25/21 118/80   08/08/19 130/82   07/24/19 130/70       Objective:     General: alert, cooperative, no distress   Mental  status: mental status: alert, oriented to person, place, and time, normal mood, behavior, speech, dress, motor activity, and thought processes   Resp: resp: normal effort and no respiratory distress   Neuro: neuro: no gross deficits           Assessment & Plan:   Hx L Hip replacement  Healed well. Seeing Dr. Zehra Landin clinic in [address] [facility]. We are now maintaining her pain meds. Con't to use the meloxicam 15mg/d, and resume the tramadol 50mg every day-BID, #100/ 3mo. PT notes she will make sure she spaces out the tramadol 6+ hours from any ambien dose. Anemia  Last CBC ok. Con't iron pills and recheck today. Fibromyalgia  well controlled. R/S tramadol as above, should help. Con't zanaflex. HTN/CKD3   BP good. Recheck labs. Adjust PRN    HLD  well controlled. con't current tx. Labs due, adjust PRN. GERD  well controlled. con't current tx. OAB   Doing well on oxybutnin. Con't. F/U 3mo.

## 2021-06-26 LAB
ALBUMIN SERPL-MCNC: 4 G/DL (ref 3.5–5)
ALBUMIN/GLOB SERPL: 1.3 {RATIO} (ref 1.1–2.2)
ALP SERPL-CCNC: 108 U/L (ref 45–117)
ALT SERPL-CCNC: 21 U/L (ref 12–78)
ANION GAP SERPL CALC-SCNC: 5 MMOL/L (ref 5–15)
AST SERPL-CCNC: 17 U/L (ref 15–37)
BASOPHILS # BLD: 0.1 K/UL (ref 0–0.1)
BASOPHILS NFR BLD: 1 % (ref 0–1)
BILIRUB SERPL-MCNC: 0.6 MG/DL (ref 0.2–1)
BUN SERPL-MCNC: 22 MG/DL (ref 6–20)
BUN/CREAT SERPL: 20 (ref 12–20)
CALCIUM SERPL-MCNC: 9.7 MG/DL (ref 8.5–10.1)
CHLORIDE SERPL-SCNC: 102 MMOL/L (ref 97–108)
CHOLEST SERPL-MCNC: 164 MG/DL
CO2 SERPL-SCNC: 31 MMOL/L (ref 21–32)
CREAT SERPL-MCNC: 1.09 MG/DL (ref 0.55–1.02)
DIFFERENTIAL METHOD BLD: ABNORMAL
EOSINOPHIL # BLD: 0.3 K/UL (ref 0–0.4)
EOSINOPHIL NFR BLD: 3 % (ref 0–7)
ERYTHROCYTE [DISTWIDTH] IN BLOOD BY AUTOMATED COUNT: 12.8 % (ref 11.5–14.5)
GLOBULIN SER CALC-MCNC: 3.2 G/DL (ref 2–4)
GLUCOSE SERPL-MCNC: 82 MG/DL (ref 65–100)
HCT VFR BLD AUTO: 34.9 % (ref 35–47)
HDLC SERPL-MCNC: 70 MG/DL
HDLC SERPL: 2.3 {RATIO} (ref 0–5)
HGB BLD-MCNC: 10.8 G/DL (ref 11.5–16)
IMM GRANULOCYTES # BLD AUTO: 0.1 K/UL (ref 0–0.04)
IMM GRANULOCYTES NFR BLD AUTO: 1 % (ref 0–0.5)
IRON SATN MFR SERPL: 12 % (ref 20–50)
IRON SERPL-MCNC: 39 UG/DL (ref 35–150)
LDLC SERPL CALC-MCNC: 70.2 MG/DL (ref 0–100)
LYMPHOCYTES # BLD: 1.5 K/UL (ref 0.8–3.5)
LYMPHOCYTES NFR BLD: 17 % (ref 12–49)
MCH RBC QN AUTO: 29.7 PG (ref 26–34)
MCHC RBC AUTO-ENTMCNC: 30.9 G/DL (ref 30–36.5)
MCV RBC AUTO: 95.9 FL (ref 80–99)
MONOCYTES # BLD: 0.6 K/UL (ref 0–1)
MONOCYTES NFR BLD: 6 % (ref 5–13)
NEUTS SEG # BLD: 6.6 K/UL (ref 1.8–8)
NEUTS SEG NFR BLD: 72 % (ref 32–75)
NRBC # BLD: 0 K/UL (ref 0–0.01)
NRBC BLD-RTO: 0 PER 100 WBC
PLATELET # BLD AUTO: 343 K/UL (ref 150–400)
PMV BLD AUTO: 10.5 FL (ref 8.9–12.9)
POTASSIUM SERPL-SCNC: 4 MMOL/L (ref 3.5–5.1)
PROT SERPL-MCNC: 7.2 G/DL (ref 6.4–8.2)
RBC # BLD AUTO: 3.64 M/UL (ref 3.8–5.2)
SODIUM SERPL-SCNC: 138 MMOL/L (ref 136–145)
TIBC SERPL-MCNC: 322 UG/DL (ref 250–450)
TRIGL SERPL-MCNC: 119 MG/DL (ref ?–150)
VLDLC SERPL CALC-MCNC: 23.8 MG/DL
WBC # BLD AUTO: 9 K/UL (ref 3.6–11)

## 2021-06-28 NOTE — PROGRESS NOTES
Labs good, even iron level is improving, but still too low. Need to continue iron pills for now. Continue current regimen.

## 2021-07-14 ENCOUNTER — TELEPHONE (OUTPATIENT)
Dept: FAMILY MEDICINE CLINIC | Age: 76
End: 2021-07-14

## 2021-07-14 NOTE — TELEPHONE ENCOUNTER
----- Message from Destini John sent at 7/14/2021 11:58 AM EDT -----  Regarding: Salvatore Tran MD/ telephone  Caller's first and last name:Reason for call: unclearCallback required yes/no and why: unclearBest contact number(s):[phone] to clarify the request: pt would like Dr. Hough nurse to call her back. Pt stated that SHE knows what's its about since she just talked to her. .... ?

## 2021-07-15 NOTE — TELEPHONE ENCOUNTER
Patient states no further needs at this time.  Was concerned regarding refill of scripts Acute encephalopathy

## 2021-10-04 ENCOUNTER — OFFICE VISIT (OUTPATIENT)
Dept: FAMILY MEDICINE CLINIC | Age: 76
End: 2021-10-04
Payer: MEDICARE

## 2021-10-04 VITALS
HEART RATE: 83 BPM | RESPIRATION RATE: 18 BRPM | OXYGEN SATURATION: 100 % | WEIGHT: 140.4 LBS | TEMPERATURE: 98.3 F | SYSTOLIC BLOOD PRESSURE: 116 MMHG | DIASTOLIC BLOOD PRESSURE: 78 MMHG | HEIGHT: 68 IN | BODY MASS INDEX: 21.28 KG/M2

## 2021-10-04 DIAGNOSIS — D12.6 ADENOMATOUS POLYP OF COLON, UNSPECIFIED PART OF COLON: ICD-10-CM

## 2021-10-04 DIAGNOSIS — S72.002S CLOSED FRACTURE OF NECK OF LEFT FEMUR, SEQUELA: ICD-10-CM

## 2021-10-04 DIAGNOSIS — Z23 ENCOUNTER FOR IMMUNIZATION: ICD-10-CM

## 2021-10-04 DIAGNOSIS — M47.816 LUMBAR SPONDYLOSIS: ICD-10-CM

## 2021-10-04 DIAGNOSIS — M79.7 FIBROMYALGIA: ICD-10-CM

## 2021-10-04 DIAGNOSIS — M12.811 ROTATOR CUFF TEAR ARTHROPATHY OF RIGHT SHOULDER: ICD-10-CM

## 2021-10-04 DIAGNOSIS — M17.12 PRIMARY OSTEOARTHRITIS OF LEFT KNEE: ICD-10-CM

## 2021-10-04 DIAGNOSIS — M75.101 ROTATOR CUFF TEAR ARTHROPATHY OF RIGHT SHOULDER: ICD-10-CM

## 2021-10-04 DIAGNOSIS — E78.00 HIGH CHOLESTEROL: ICD-10-CM

## 2021-10-04 DIAGNOSIS — I10 PRIMARY HYPERTENSION: Primary | ICD-10-CM

## 2021-10-04 DIAGNOSIS — N18.30 STAGE 3 CHRONIC KIDNEY DISEASE, UNSPECIFIED WHETHER STAGE 3A OR 3B CKD (HCC): ICD-10-CM

## 2021-10-04 DIAGNOSIS — D50.0 IRON DEFICIENCY ANEMIA DUE TO CHRONIC BLOOD LOSS: ICD-10-CM

## 2021-10-04 LAB
ANION GAP SERPL CALC-SCNC: 4 MMOL/L (ref 5–15)
BASOPHILS # BLD: 0 K/UL (ref 0–0.1)
BASOPHILS NFR BLD: 1 % (ref 0–1)
BUN SERPL-MCNC: 21 MG/DL (ref 6–20)
BUN/CREAT SERPL: 17 (ref 12–20)
CALCIUM SERPL-MCNC: 9.8 MG/DL (ref 8.5–10.1)
CHLORIDE SERPL-SCNC: 103 MMOL/L (ref 97–108)
CO2 SERPL-SCNC: 29 MMOL/L (ref 21–32)
CREAT SERPL-MCNC: 1.21 MG/DL (ref 0.55–1.02)
DIFFERENTIAL METHOD BLD: ABNORMAL
EOSINOPHIL # BLD: 0.3 K/UL (ref 0–0.4)
EOSINOPHIL NFR BLD: 4 % (ref 0–7)
ERYTHROCYTE [DISTWIDTH] IN BLOOD BY AUTOMATED COUNT: 13.4 % (ref 11.5–14.5)
GLUCOSE SERPL-MCNC: 78 MG/DL (ref 65–100)
HCT VFR BLD AUTO: 35 % (ref 35–47)
HGB BLD-MCNC: 11.1 G/DL (ref 11.5–16)
IMM GRANULOCYTES # BLD AUTO: 0 K/UL (ref 0–0.04)
IMM GRANULOCYTES NFR BLD AUTO: 0 % (ref 0–0.5)
IRON SATN MFR SERPL: 15 % (ref 20–50)
IRON SERPL-MCNC: 48 UG/DL (ref 35–150)
LYMPHOCYTES # BLD: 1.6 K/UL (ref 0.8–3.5)
LYMPHOCYTES NFR BLD: 23 % (ref 12–49)
MCH RBC QN AUTO: 31 PG (ref 26–34)
MCHC RBC AUTO-ENTMCNC: 31.7 G/DL (ref 30–36.5)
MCV RBC AUTO: 97.8 FL (ref 80–99)
MONOCYTES # BLD: 0.5 K/UL (ref 0–1)
MONOCYTES NFR BLD: 7 % (ref 5–13)
NEUTS SEG # BLD: 4.4 K/UL (ref 1.8–8)
NEUTS SEG NFR BLD: 65 % (ref 32–75)
NRBC # BLD: 0 K/UL (ref 0–0.01)
NRBC BLD-RTO: 0 PER 100 WBC
PLATELET # BLD AUTO: 376 K/UL (ref 150–400)
PMV BLD AUTO: 10.3 FL (ref 8.9–12.9)
POTASSIUM SERPL-SCNC: 4.2 MMOL/L (ref 3.5–5.1)
RBC # BLD AUTO: 3.58 M/UL (ref 3.8–5.2)
SODIUM SERPL-SCNC: 136 MMOL/L (ref 136–145)
TIBC SERPL-MCNC: 313 UG/DL (ref 250–450)
WBC # BLD AUTO: 6.8 K/UL (ref 3.6–11)

## 2021-10-04 PROCEDURE — G0008 ADMIN INFLUENZA VIRUS VAC: HCPCS | Performed by: FAMILY MEDICINE

## 2021-10-04 PROCEDURE — 99214 OFFICE O/P EST MOD 30 MIN: CPT | Performed by: FAMILY MEDICINE

## 2021-10-04 PROCEDURE — 90694 VACC AIIV4 NO PRSRV 0.5ML IM: CPT | Performed by: FAMILY MEDICINE

## 2021-10-04 RX ORDER — TRAMADOL HYDROCHLORIDE 50 MG/1
50 TABLET ORAL
Qty: 100 TABLET | Refills: 0 | Status: SHIPPED | OUTPATIENT
Start: 2021-10-04 | End: 2022-09-20 | Stop reason: SDUPTHER

## 2021-10-04 RX ORDER — DICLOFENAC SODIUM 75 MG/1
75 TABLET, DELAYED RELEASE ORAL 2 TIMES DAILY
COMMUNITY
Start: 2021-09-09 | End: 2021-10-09

## 2021-10-04 RX ORDER — CHOLECALCIFEROL (VITAMIN D3) 50 MCG
1000 CAPSULE ORAL DAILY
COMMUNITY

## 2021-10-04 NOTE — Clinical Note
10/4/2021 10:52 AM    Ms. Elena Ceron  [address]  [place] [facility] [id number]              Sincerely,      Blaine Sanders MD

## 2021-10-04 NOTE — PATIENT INSTRUCTIONS
Hamstring Strain: Rehab Exercises  Introduction  Here are some examples of exercises for you to try. The exercises may be suggested for a condition or for rehabilitation. Start each exercise slowly. Ease off the exercises if you start to have pain. You will be told when to start these exercises and which ones will work best for you. How to do the exercises  Hamstring set (heel dig)    1. Sit with your affected leg bent. Your good leg should be straight and supported on the floor. 2. Tighten the muscles on the back of your bent leg (hamstring) by pressing your heel into the floor. 3. Hold for about 6 seconds, and then rest for up to 10 seconds. 4. Repeat 8 to 12 times. Hamstring curl    1. Lie on your stomach with your knees straight. Place a pillow under your stomach. If your kneecap is uncomfortable, roll up a washcloth and put it under your leg just above your kneecap. 2. Lift the foot of your affected leg by bending your knee so that you bring your foot up toward your buttock. If this motion hurts, try it without bending your knee quite as far. This may help you avoid any painful motion. 3. Slowly move your leg up and down. 4. Repeat 8 to 12 times. 5. When you can do this exercise with ease and no pain, add some resistance. To do this:  6. Tie the ends of an exercise band together to form a loop. Attach one end of the loop to a secure object or shut a door on it to hold it in place. (Or you can have someone hold one end of the loop to provide resistance.)  7. Loop the other end of the exercise band around the lower part of your affected leg. 8. Repeat steps 1 through 4, slowly pulling back on the exercise band with your leg. Hip extension    1. Stand facing a wall with your hands on the wall at about chest level. 2. Keeping the knee of your affected leg straight, kick that leg straight back behind you. 3. Relax, and lower your leg back to the starting position. 4. Repeat 8 to 12 times.   5. When you can do this exercise with ease and no pain, add some resistance. To do this:  6. Tie the ends of an exercise band together to form a loop. Attach one end of the loop to a secure object or shut a door on it to hold it in place. (Or you can have someone hold one end of the loop to provide resistance.)  7. Loop the other end of the exercise band around the lower part of your affected leg. 8. Repeat steps 1 through 4, slowly pulling back on the exercise band with your leg. Hamstring wall stretch    1. Lie on your back in a doorway, with your good leg through the open door. 2. Slide your affected leg up the wall to straighten your knee. You should feel a gentle stretch down the back of your leg. 3. Hold the stretch for at least 1 minute to begin. Then try to lengthen the time you hold the stretch to as long as 6 minutes. 4. Repeat 2 to 4 times. 5. If you do not have a place to do this exercise in a doorway, there is another way to do it:  6. Lie on your back, and bend the knee of your affected leg. 7. Loop a towel under the ball and toes of that foot, and hold the ends of the towel in your hands. 8. Straighten your knee, and slowly pull back on the towel. You should feel a gentle stretch down the back of your leg. 9. Hold the stretch for 15 to 30 seconds. Or even better, hold the stretch for 1 minute if you can. 10. Repeat 2 to 4 times. 1. Do not arch your back. 2. Do not bend either knee. 3. Keep one heel touching the floor and the other heel touching the wall. Do not point your toes. Calf stretch    1. Stand facing a wall with your hands on the wall at about eye level. Put your affected leg about a step behind your other leg. 2. Keeping your back leg straight and your back heel on the floor, bend your front knee and gently bring your hip and chest toward the wall until you feel a stretch in the calf of your back leg. 3. Hold the stretch for 15 to 30 seconds. 4. Repeat 2 to 4 times.   5. Repeat steps 1 through 4, but this time keep your back knee bent. Single-leg balance    1. Stand on a flat surface with your arms stretched out to your sides like you are making the letter \"T. \" Then lift your good leg off the floor, bending it at the knee. If you are not steady on your feet, use one hand to hold on to a chair, counter, or wall. 2. Standing on your affected leg, keep that knee straight. Try to balance on that leg for up to 30 seconds. Then rest for up to 10 seconds. 3. Repeat 6 to 8 times. 4. When you can balance on your affected leg for 30 seconds with your eyes open, try to balance on it with your eyes closed. 5. When you can do this exercise with your eyes closed for 30 seconds and with ease and no pain, try standing on a pillow or piece of foam, and repeat steps 1 through 4. Follow-up care is a key part of your treatment and safety. Be sure to make and go to all appointments, and call your doctor if you are having problems. It's also a good idea to know your test results and keep a list of the medicines you take. Where can you learn more? Go to http://www.gray.com/  Enter Z526 in the search box to learn more about \"Hamstring Strain: Rehab Exercises. \"  Current as of: July 1, 2021               Content Version: 13.0  © 2006-2021 [employer]. Care instructions adapted under license by [employer] (which disclaims liability or warranty for this information). If you have questions about a medical condition or this instruction, always ask your healthcare professional. [place] [address] any warranty or liability for your use of this information. Rotator Cuff: Exercises  Introduction  Here are some examples of exercises for you to try. The exercises may be suggested for a condition or for rehabilitation. Start each exercise slowly. Ease off the exercises if you start to have pain.   You will be told when to start these exercises and which ones will work best for you. How to do the exercises  Pendulum swing    If you have pain in your back, do not do this exercise. 5. Hold on to a table or the back of a chair with your good arm. Then bend forward a little and let your sore arm hang straight down. This exercise does not use the arm muscles. Rather, use your legs and your hips to create movement that makes your arm swing freely. 6. Use the movement from your hips and legs to guide the slightly swinging arm back and forth like a pendulum (or elephant trunk). Then guide it in circles that start small (about the size of a dinner plate). Make the circles a bit larger each day, as your pain allows. 7. Do this exercise for 5 minutes, 5 to 7 times each day. 8. As you have less pain, try bending over a little farther to do this exercise. This will increase the amount of movement at your shoulder. Posterior stretching exercise    9. Hold the elbow of your injured arm with your other hand. 10. Use your hand to pull your injured arm gently up and across your body. You will feel a gentle stretch across the back of your injured shoulder. 11. Hold for at least 15 to 30 seconds. Then slowly lower your arm. 12. Repeat 2 to 4 times. Up-the-back stretch    Your doctor or physical therapist may want you to wait to do this stretch until you have regained most of your range of motion and strength. You can do this stretch in different ways. Hold any of these stretches for at least 15 to 30 seconds. Repeat them 2 to 4 times. 9. Light stretch: Put your hand in your back pocket. Let it rest there to stretch your shoulder. 10. Moderate stretch: With your other hand, hold your injured arm (palm outward) behind your back by the wrist. Pull your arm up gently to stretch your shoulder. 11. Advanced stretch: Put a towel over your other shoulder. Put the hand of your injured arm behind your back. Now hold the back end of the towel.  With the other hand, hold the front end of the towel in front of your body. Pull gently on the front end of the towel. This will bring your hand farther up your back to stretch your shoulder. Overhead stretch    11. Standing about an arm's length away, grasp onto a solid surface. You could use a countertop, a doorknob, or the back of a sturdy chair. 12. With your knees slightly bent, bend forward with your arms straight. Lower your upper body, and let your shoulders stretch. 13. As your shoulders are able to stretch farther, you may need to take a step or two backward. 14. Hold for at least 15 to 30 seconds. Then stand up and relax. If you had stepped back during your stretch, step forward so you can keep your hands on the solid surface. 15. Repeat 2 to 4 times. Shoulder flexion (lying down)    To make a wand for this exercise, use a piece of PVC pipe or a broom handle with the broom removed. Make the wand about a foot wider than your shoulders. 4. Lie on your back, holding a wand with both hands. Your palms should face down as you hold the wand. 5. Keeping your elbows straight, slowly raise your arms over your head. Raise them until you feel a stretch in your shoulders, upper back, and chest.  6. Hold for 15 to 30 seconds. 7. Repeat 2 to 4 times. Shoulder rotation (lying down)    To make a wand for this exercise, use a piece of PVC pipe or a broom handle with the broom removed. Make the wand about a foot wider than your shoulders. 6. Lie on your back. Hold a wand with both hands with your elbows bent and palms up. 7. Keep your elbows close to your body, and move the wand across your body toward the sore arm. 8. Hold for 8 to 12 seconds. 9. Repeat 2 to 4 times. Wall climbing (to the side)    Avoid any movement that is straight to your side, and be careful not to arch your back. Your arm should stay about 30 degrees to the front of your side.   6. Stand with your side to a wall so that your fingers can just touch it at an angle about 30 degrees toward the front of your body. 7. Walk the fingers of your injured arm up the wall as high as pain permits. Try not to shrug your shoulder up toward your ear as you move your arm up. 8. Hold that position for a count of at least 15 to 20.  9. Walk your fingers back down to the starting position. 10. Repeat at least 2 to 4 times. Try to reach higher each time. Wall climbing (to the front)    During this stretching exercise, be careful not to arch your back. 1. Face a wall, and stand so your fingers can just touch it. 2. Keeping your shoulder down, walk the fingers of your injured arm up the wall as high as pain permits. (Don't shrug your shoulder up toward your ear.)  3. Hold your arm in that position for at least 15 to 30 seconds. 4. Slowly walk your fingers back down to where you started. 5. Repeat at least 2 to 4 times. Try to reach higher each time. Shoulder blade squeeze    1. Stand with your arms at your sides, and squeeze your shoulder blades together. Do not raise your shoulders up as you squeeze. 2. Hold 6 seconds. 3. Repeat 8 to 12 times. Scapular exercise: Arm reach    1. Lie flat on your back. This exercise is a very slight motion that starts with your arms raised (elbows straight, arms straight). 2. From this position, reach higher toward the nasir or ceiling. Keep your elbows straight. All motion should be from your shoulder blade only. 3. Relax your arms back to where you started. 4. Repeat 8 to 12 times. Arm raise to the side    During this strengthening exercise, your arm should stay about 30 degrees to the front of your side. 1. Slowly raise your injured arm to the side, with your thumb facing up. Raise your arm 60 degrees at the most (shoulder level is 90 degrees). 2. Hold the position for 3 to 5 seconds. Then lower your arm back to your side. If you need to, bring your \"good\" arm across your body and place it under the elbow as you lower your injured arm.  Use your good arm to keep your injured arm from dropping down too fast.  3. Repeat 8 to 12 times. 4. When you first start out, don't hold any extra weight in your hand. As you get stronger, you may use a 1-pound to 2-pound dumbbell or a small can of food. Shoulder flexor and extensor exercise    These are isometric exercises. That means you contract your muscles without actually moving. 1. Push forward (flex): Stand facing a wall or doorjamb, about 6 inches or less back. Hold your injured arm against your body. Make a closed fist with your thumb on top. Then gently push your hand forward into the wall with about 25% to 50% of your strength. Don't let your body move backward as you push. Hold for about 6 seconds. Relax for a few seconds. Repeat 8 to 12 times. 2. Push backward (extend): Stand with your back flat against a wall. Your upper arm should be against the wall, with your elbow bent 90 degrees (your hand straight ahead). Push your elbow gently back against the wall with about 25% to 50% of your strength. Don't let your body move forward as you push. Hold for about 6 seconds. Relax for a few seconds. Repeat 8 to 12 times. Scapular exercise: Wall push-ups    This exercise is best done with your fingers somewhat turned out, rather than straight up and down. 1. Stand facing a wall, about 12 inches to 18 inches away. 2. Place your hands on the wall at shoulder height. 3. Slowly bend your elbows and bring your face to the wall. Keep your back and hips straight. 4. Push back to where you started. 5. Repeat 8 to 12 times. 6. When you can do this exercise against a wall comfortably, you can try it against a counter. You can then slowly progress to the end of a couch, then to a sturdy chair, and finally to the floor. Scapular exercise: Retraction    For this exercise, you will need elastic exercise material, such as surgical tubing or Thera-Band. 1. Put the band around a solid object at about waist level.  (A bedpost will work well.) Each hand should hold an end of the band. 2. With your elbows at your sides and bent to 90 degrees, pull the band back. Your shoulder blades should move toward each other. Then move your arms back where you started. 3. Repeat 8 to 12 times. 4. If you have good range of motion in your shoulders, try this exercise with your arms lifted out to the sides. Keep your elbows at a 90-degree angle. Raise the elastic band up to about shoulder level. Pull the band back to move your shoulder blades toward each other. Then move your arms back where you started. Internal rotator strengthening exercise    1. Start by tying a piece of elastic exercise material to a doorknob. You can use surgical tubing or Thera-Band. 2. Stand or sit with your shoulder relaxed and your elbow bent 90 degrees. Your upper arm should rest comfortably against your side. Squeeze a rolled towel between your elbow and your body for comfort. This will help keep your arm at your side. 3. Hold one end of the elastic band in the hand of the painful arm. 4. Slowly rotate your forearm toward your body until it touches your belly. Slowly move it back to where you started. 5. Keep your elbow and upper arm firmly tucked against the towel roll or at your side. 6. Repeat 8 to 12 times. External rotator strengthening exercise    1. Start by tying a piece of elastic exercise material to a doorknob. You can use surgical tubing or Thera-Band. (You may also hold one end of the band in each hand.)  2. Stand or sit with your shoulder relaxed and your elbow bent 90 degrees. Your upper arm should rest comfortably against your side. Squeeze a rolled towel between your elbow and your body for comfort. This will help keep your arm at your side. 3. Hold one end of the elastic band with the hand of the painful arm. 4. Start with your forearm across your belly. Slowly rotate the forearm out away from your body.  Keep your elbow and upper arm tucked against the towel roll or the side of your body until you begin to feel tightness in your shoulder. Slowly move your arm back to where you started. 5. Repeat 8 to 12 times. Follow-up care is a key part of your treatment and safety. Be sure to make and go to all appointments, and call your doctor if you are having problems. It's also a good idea to know your test results and keep a list of the medicines you take. Where can you learn more? Go to http://sandi-kendall.info/  Enter J005 in the search box to learn more about \"Rotator Cuff: Exercises. \"  Current as of: July 1, 2021               Content Version: 13.0  © 2006-2021 [employer]. Care instructions adapted under license by [employer] (which disclaims liability or warranty for this information). If you have questions about a medical condition or this instruction, always ask your healthcare professional. [address] any warranty or liability for your use of this information.

## 2021-10-04 NOTE — PROGRESS NOTES
Darlene Garcia is a 76 y.o. female     Subjective:   Knee Swelling (left) and Hand Problem (numb (right))    L hip fx s/p THR with Dr. Mau Vanegas  Has been doing ok lately. Hip fx has healed well. Current pain relief with Tramadol. Doing well overall. Ortho is managing pt's pain, she doing well on tramadol and meloxicam now for NSAID, starr well. Lab Results   Component Value Date/Time    WBC 9.0 06/25/2021 09:47 AM    HGB (POC) 12.2 01/05/2015 10:30 AM    HGB 10.8 (L) 06/25/2021 09:47 AM    HCT (POC) 38.4 01/05/2015 10:30 AM    HCT 34.9 (L) 06/25/2021 09:47 AM    PLATELET 020 79/47/0106 09:47 AM    MCV 95.9 06/25/2021 09:47 AM       Hypertension and CKD3. Blood pressures have been stable/in goal. Management at last visit included continuing current regimen . Current regimen: angiotensin II receptor antagonist, calcium channel blocker and thiazide diuretic. Symptoms include no symptoms. Patient denies chest pain, palpitations, peripheral edema, headache. Lab review:   Lab Results   Component Value Date/Time    Sodium 138 06/25/2021 09:47 AM    Potassium 4.0 06/25/2021 09:47 AM    Chloride 102 06/25/2021 09:47 AM    CO2 31 06/25/2021 09:47 AM    Anion gap 5 06/25/2021 09:47 AM    Glucose 82 06/25/2021 09:47 AM    BUN 22 (H) 06/25/2021 09:47 AM    Creatinine 1.09 (H) 06/25/2021 09:47 AM    BUN/Creatinine ratio 20 06/25/2021 09:47 AM    GFR est AA 59 (L) 06/25/2021 09:47 AM    GFR est non-AA 49 (L) 06/25/2021 09:47 AM    Calcium 9.7 06/25/2021 09:47 AM     Hyperlipidemia. On lipitor 40. Starr well. No myalgias, arthralgias, unusual weakness.   Lab Results   Component Value Date/Time    Cholesterol, total 164 06/25/2021 09:47 AM    HDL Cholesterol 70 06/25/2021 09:47 AM    LDL, calculated 70.2 06/25/2021 09:47 AM    VLDL, calculated 23.8 06/25/2021 09:47 AM    Triglyceride 119 06/25/2021 09:47 AM    CHOL/HDL Ratio 2.3 06/25/2021 09:47 AM     Lab Results   Component Value Date/Time    ALT (SGPT) 21 06/25/2021 09:47 AM Alk. phosphatase 108 06/25/2021 09:47 AM    Bilirubin, total 0.6 06/25/2021 09:47 AM     GERD  On prilosec. Working well. Fibromyalgia  Remains on prozac, flexeril, gabapentin, mobic. Back on tramadol, no longer on [place] 5's. Her care has prev been Managed by Rheum Dr. Marlyn Sanchez MD., rheumatologist at [facility], and Ortho at Dr. Janna scott in [facility]. Hyperlipidemia. On lipitor 40. Linda well. No myalgias, arthralgias, unusual weakness. Lab Results   Component Value Date/Time    Cholesterol, total 164 06/25/2021 09:47 AM    HDL Cholesterol 70 06/25/2021 09:47 AM    LDL, calculated 70.2 06/25/2021 09:47 AM    VLDL, calculated 23.8 06/25/2021 09:47 AM    Triglyceride 119 06/25/2021 09:47 AM    CHOL/HDL Ratio 2.3 06/25/2021 09:47 AM       Lab Results   Component Value Date/Time    ALT (SGPT) 21 06/25/2021 09:47 AM    Alk. phosphatase 108 06/25/2021 09:47 AM    Bilirubin, total 0.6 06/25/2021 09:47 AM         PMH, SH, Medications/Allergies: reviewed, on chart. Current Outpatient Medications   Medication Sig    diclofenac EC (VOLTAREN) 75 mg EC tablet Take 75 mg by mouth two (2) times a day.  zolpidem (AMBIEN) 10 mg tablet TAKE 1 TABLET BY MOUTH EVERY NIGHT.  MAX DAILY AMOUNT: 10 MG    metoprolol succinate (TOPROL-XL) 50 mg XL tablet TAKE 1 AND 1/2 TABLETS BY MOUTH EVERY DAY FOR BLOOD PRESSURE    omeprazole (PRILOSEC) 40 mg capsule TAKE 1 CAPSULE BY MOUTH DAILY    tiZANidine (ZANAFLEX) 4 mg tablet TAKE 1 TABLET BY MOUTH THREE TIMES DAILY AS NEEDED FOR SPASM    oxybutynin (DITROPAN) 5 mg tablet TAKE 1 TABLET BY MOUTH TWICE DAILY FOR BLADDER    atorvastatin (LIPITOR) 40 mg tablet take 1 tablet by mouth once daily for heart and cholesterol    chlorthalidone (HYGROTON) 25 mg tablet TAKE 1 TABLET BY MOUTH ONCE DAILY for pressure    meloxicam (MOBIC) 15 mg tablet TAKE 1 TABLET BY MOUTH EVERY DAY WITH FOOD AS NEEDED FOR PAIN    potassium chloride (K-DUR, KLOR-CON) 20 mEq tablet TAKE 1 TABLET BY MOUTH EVERY DAY    amLODIPine (NORVASC) 10 mg tablet TAKE 1 TABLET BY MOUTH EVERY DAY for pressure    cetirizine (ZYRTEC) 5 mg tablet Take 5 mg by mouth daily.  losartan (COZAAR) 50 mg tablet TAKE 1 TABLET BY MOUTH ONCE DAILY for pressure    vitamin E (AQUA GEMS) 400 unit capsule Take 400 Units by mouth daily.  aspirin 81 mg chewable tablet Take 81 mg by mouth daily.  gabapentin (NEURONTIN) 300 mg capsule take 1 capsule by mouth three times a day  Indications: nerve pain    FLUoxetine (PROZAC) 20 mg capsule     cholecalciferol, VITAMIN D3, (VITAMIN D3) 5,000 unit tab tablet Take  by mouth daily.  flaxseed 1,000 mg cap Take 1,000 mg by mouth daily.  morinda citrifolia fruit 250 mg cap Take 250 mg by mouth.  MV,FE,MIN/LUTEIN (CENTRUM SILVER ULTRA WOMEN'S PO) Take  by mouth.  CALCIUM CARBONATE (CALCIUM 600 PO) Take 600 mg by mouth daily.  fish oil-dha-epa 1,200-144-216 mg cap Take  by mouth.  ascorbic acid, vitamin C, (VITAMIN C) 1,000 mg tablet Take  by mouth.  polyethylene glycol (MIRALAX) 17 gram packet Take 17 g by mouth daily.  cyanocobalamin 1,000 mcg tablet Take 1,000 mcg by mouth daily. Indications: PREVENTION OF VITAMIN B12 DEFICIENCY    dextran 70-hypromellose (ARTIFICIAL TEARS) ophthalmic solution Administer 2 Drops to both eyes as needed. Indications: DRY EYE    omega 3-dha-epa-fish oil (Fish OiL) 100-160-1,000 mg cap Take 1,000 mg by mouth daily. No current facility-administered medications for this visit.       No Known Allergies    ROS:  Constitutional: No fever, chills or abnormal weight loss  Respiratory: No cough, SOB   CV: No chest pain or Palpitations    VS review:  Visit Vitals  /78 (BP 1 Location: Right arm)   Pulse 83   Temp 98.3 °F (36.8 °C)   Resp 18   Ht 5' 8\" (1.727 m)   Wt 140 lb 6.4 oz (63.7 kg)   SpO2 100%   BMI 21.35 kg/m²     -4#  Wt Readings from Last 3 Encounters:   10/04/21 140 lb 6.4 oz (63.7 kg)   06/25/21 144 lb 12.8 oz (65.7 kg) 19 167 lb (75.8 kg)     BP Readings from Last 3 Encounters:   10/04/21 116/78   21 118/80   19 130/82       Objective:     General: alert, cooperative, no distress   Mental  status: mental status: alert, oriented to person, place, and time, normal mood, behavior, speech, dress, motor activity, and thought processes   Resp: resp: normal effort and no respiratory distress   Neuro: neuro: no gross deficits           Assessment & Plan:   Hx L Hip replacement  Healed well. Seeing Dr. Janna Pritchett clinic in [facility]. We are now maintaining her pain meds. Con't to use the meloxicam 15mg/d, and resume the tramadol 50mg every day-BID, #100/ 3mo. PT notes she will make sure she spaces out the tramadol 6+ hours from any ambien dose. Anemia  Last CBC pretty good. Con't iron pills and recheck today, adj PRN. Colon polyps seen on last scope in  [facility] 2018, due for 3y followup this year. Fibromyalgia  well controlled. RF tramadol PRN and con't zanaflex. HTN/CKD3   BP good. Recheck labs today and adjust PRN    HLD  well controlled. con't current tx. Labs due summer 2022, adjust PRN. GERD  well controlled. con't current tx. OAB   Doing well on oxybutnin. Con't. F/U 3mo. 1. Have you been to the ER, urgent care clinic since your last visit? Hospitalized since your last visit? No    2. Have you seen or consulted any other health care providers outside of the [address] [place] [address] since your last visit? Include any pap smears or colon screening. No    Identified pt with two pt identifiers(name and ). Reviewed record in preparation for visit and have obtained necessary documentation.     Symptom review:    NO  Fever   NO  Shaking chills  NO  Cough  NO Headaches  YES  Body aches  NO  Coughing up blood  NO  Chest congestion  NO  Chest pain  NO  Shortness of breath  NO  Profound Loss of smell/taste  NO  Nausea/Vomiting   NO  Loose stool/Diarrhea  NO  any skin issues

## 2021-10-04 NOTE — PROGRESS NOTES
After obtaining consent, and per orders of Dr. Ottoniel Asher, injection of Fluad to (L) deltoid given by Jena Izaguirre. Patient instructed to remain in clinic for 20 minutes afterwards, and to report any adverse reaction to me immediately.

## 2021-10-04 NOTE — Clinical Note
NOTIFICATION RETURN TO WORK / SCHOOL    10/4/2021 10:52 AM    Ms. Tommy Skinner  [address] [place] [id number]      To Whom It May Concern:    Tommy Skinner is currently under the care of Jairon Napoles. She will return to work/school on: ***    If there are questions or concerns please have the patient contact our office.         Sincerely,      Kristin Fernández MD

## 2021-10-08 ENCOUNTER — TELEPHONE (OUTPATIENT)
Dept: FAMILY MEDICINE CLINIC | Age: 76
End: 2021-10-08

## 2021-10-08 NOTE — TELEPHONE ENCOUNTER
Referral Gastroenterology    Appt: 10/27/21 @ 10:45am with Georgie Munoz    Pt notified of appt details, address/#

## 2021-11-16 ENCOUNTER — OFFICE VISIT (OUTPATIENT)
Dept: FAMILY MEDICINE CLINIC | Age: 76
End: 2021-11-16
Payer: MEDICARE

## 2021-11-16 VITALS
HEIGHT: 68 IN | HEART RATE: 58 BPM | BODY MASS INDEX: 20.73 KG/M2 | RESPIRATION RATE: 20 BRPM | TEMPERATURE: 97.5 F | OXYGEN SATURATION: 98 % | SYSTOLIC BLOOD PRESSURE: 132 MMHG | DIASTOLIC BLOOD PRESSURE: 88 MMHG | WEIGHT: 136.8 LBS

## 2021-11-16 DIAGNOSIS — E78.00 HIGH CHOLESTEROL: ICD-10-CM

## 2021-11-16 DIAGNOSIS — N18.31 HYPERTENSIVE KIDNEY DISEASE WITH STAGE 3A CHRONIC KIDNEY DISEASE (HCC): ICD-10-CM

## 2021-11-16 DIAGNOSIS — Z96.642 STATUS POST LEFT HIP REPLACEMENT: ICD-10-CM

## 2021-11-16 DIAGNOSIS — F43.21 ADJUSTMENT DISORDER WITH DEPRESSED MOOD: Primary | ICD-10-CM

## 2021-11-16 DIAGNOSIS — M79.7 FIBROMYALGIA: ICD-10-CM

## 2021-11-16 DIAGNOSIS — I10 ESSENTIAL HYPERTENSION, BENIGN: ICD-10-CM

## 2021-11-16 DIAGNOSIS — I12.9 HYPERTENSIVE KIDNEY DISEASE WITH STAGE 3A CHRONIC KIDNEY DISEASE (HCC): ICD-10-CM

## 2021-11-16 PROCEDURE — 99214 OFFICE O/P EST MOD 30 MIN: CPT | Performed by: FAMILY MEDICINE

## 2021-11-16 RX ORDER — FLUOXETINE HYDROCHLORIDE 40 MG/1
40 CAPSULE ORAL DAILY
Qty: 90 CAPSULE | Refills: 3 | Status: SHIPPED | OUTPATIENT
Start: 2021-11-16 | End: 2022-09-20 | Stop reason: SDUPTHER

## 2021-11-16 NOTE — PROGRESS NOTES
Diane lSade is a 68 y.o. female     Subjective:   Joint Pain and Weight Loss    Depression  Feeling more down lately after losing several family members over past few months. Not sleeping well. Decreased appetite. 3 most recent PHQ Screens 11/16/2021   PHQ Not Done -   Little interest or pleasure in doing things Nearly every day   Feeling down, depressed, irritable, or hopeless Nearly every day   Total Score PHQ 2 6   Trouble falling or staying asleep, or sleeping too much More than half the days   Feeling tired or having little energy More than half the days   Poor appetite, weight loss, or overeating More than half the days   Feeling bad about yourself - or that you are a failure or have let yourself or your family down Not at all   Trouble concentrating on things such as school, work, reading, or watching TV Several days   Moving or speaking so slowly that other people could have noticed; or the opposite being so fidgety that others notice Not at all   Thoughts of being better off dead, or hurting yourself in some way Not at all   PHQ 9 Score 13       Hypertension and CKD3. Blood pressures have been stable/in goal. Management at last visit included continuing current regimen . Current regimen: angiotensin II receptor antagonist, calcium channel blocker and thiazide diuretic. Symptoms include no symptoms. Patient denies chest pain, palpitations, peripheral edema, headache. Lab review:   Lab Results   Component Value Date/Time    Sodium 136 10/04/2021 11:28 AM    Potassium 4.2 10/04/2021 11:28 AM    Chloride 103 10/04/2021 11:28 AM    CO2 29 10/04/2021 11:28 AM    Anion gap 4 (L) 10/04/2021 11:28 AM    Glucose 78 10/04/2021 11:28 AM    BUN 21 (H) 10/04/2021 11:28 AM    Creatinine 1.21 (H) 10/04/2021 11:28 AM    BUN/Creatinine ratio 17 10/04/2021 11:28 AM    GFR est AA 53 (L) 10/04/2021 11:28 AM    GFR est non-AA 43 (L) 10/04/2021 11:28 AM    Calcium 9.8 10/04/2021 11:28 AM     Hyperlipidemia.   On lipitor 40. Linda well. No myalgias, arthralgias, unusual weakness. Lab Results   Component Value Date/Time    Cholesterol, total 164 06/25/2021 09:47 AM    HDL Cholesterol 70 06/25/2021 09:47 AM    LDL, calculated 70.2 06/25/2021 09:47 AM    VLDL, calculated 23.8 06/25/2021 09:47 AM    Triglyceride 119 06/25/2021 09:47 AM    CHOL/HDL Ratio 2.3 06/25/2021 09:47 AM     Lab Results   Component Value Date/Time    ALT (SGPT) 21 06/25/2021 09:47 AM    Alk. phosphatase 108 06/25/2021 09:47 AM    Bilirubin, total 0.6 06/25/2021 09:47 AM     GERD  On prilosec. Working well. Fibromyalgia  Remains on prozac, flexeril, gabapentin, mobic. Remains on tramadol. Prev been Managed by Rheum Dr. Luly Vernon MD., rheumatologist at [facility], and Ortho at Dr. Coy Artis [facility] in [address] [place] [address]. Hyperlipidemia. On lipitor 40. Linda well. No myalgias, arthralgias, unusual weakness. Lab Results   Component Value Date/Time    Cholesterol, total 164 06/25/2021 09:47 AM    HDL Cholesterol 70 06/25/2021 09:47 AM    LDL, calculated 70.2 06/25/2021 09:47 AM    VLDL, calculated 23.8 06/25/2021 09:47 AM    Triglyceride 119 06/25/2021 09:47 AM    CHOL/HDL Ratio 2.3 06/25/2021 09:47 AM       Lab Results   Component Value Date/Time    ALT (SGPT) 21 06/25/2021 09:47 AM    Alk. phosphatase 108 06/25/2021 09:47 AM    Bilirubin, total 0.6 06/25/2021 09:47 AM         PMH, SH, Medications/Allergies: reviewed, on chart. Current Outpatient Medications   Medication Sig    amLODIPine (NORVASC) 10 mg tablet TAKE 1 TABLET BY MOUTH EVERY DAY FOR PRESSURE    losartan (COZAAR) 50 mg tablet TAKE 1 TABLET BY MOUTH ONCE DAILY FOR BLOOD PRESSURE    omega 3-dha-epa-fish oil (Fish OiL) 100-160-1,000 mg cap Take 1,000 mg by mouth daily.  traMADoL (ULTRAM) 50 mg tablet Take 1 Tablet by mouth two (2) times daily as needed for Pain for up to 90 days.  Max Daily Amount: 100 mg. 2 tabs 3 times per day as needed for pain  Indications: pain, fibromyalgia    zolpidem (AMBIEN) 10 mg tablet TAKE 1 TABLET BY MOUTH EVERY NIGHT. MAX DAILY AMOUNT: 10 MG    metoprolol succinate (TOPROL-XL) 50 mg XL tablet TAKE 1 AND 1/2 TABLETS BY MOUTH EVERY DAY FOR BLOOD PRESSURE    omeprazole (PRILOSEC) 40 mg capsule TAKE 1 CAPSULE BY MOUTH DAILY    tiZANidine (ZANAFLEX) 4 mg tablet TAKE 1 TABLET BY MOUTH THREE TIMES DAILY AS NEEDED FOR SPASM    oxybutynin (DITROPAN) 5 mg tablet TAKE 1 TABLET BY MOUTH TWICE DAILY FOR BLADDER    atorvastatin (LIPITOR) 40 mg tablet take 1 tablet by mouth once daily for heart and cholesterol    chlorthalidone (HYGROTON) 25 mg tablet TAKE 1 TABLET BY MOUTH ONCE DAILY for pressure    meloxicam (MOBIC) 15 mg tablet TAKE 1 TABLET BY MOUTH EVERY DAY WITH FOOD AS NEEDED FOR PAIN    potassium chloride (K-DUR, KLOR-CON) 20 mEq tablet TAKE 1 TABLET BY MOUTH EVERY DAY    cetirizine (ZYRTEC) 5 mg tablet Take 5 mg by mouth daily.  vitamin E (AQUA GEMS) 400 unit capsule Take 400 Units by mouth daily.  aspirin 81 mg chewable tablet Take 81 mg by mouth daily.  gabapentin (NEURONTIN) 300 mg capsule take 1 capsule by mouth three times a day  Indications: nerve pain    FLUoxetine (PROZAC) 20 mg capsule     cholecalciferol, VITAMIN D3, (VITAMIN D3) 5,000 unit tab tablet Take  by mouth daily.  flaxseed 1,000 mg cap Take 1,000 mg by mouth daily.  morinda citrifolia fruit 250 mg cap Take 250 mg by mouth.  MV,FE,MIN/LUTEIN (CENTRUM SILVER ULTRA WOMEN'S PO) Take  by mouth.  CALCIUM CARBONATE (CALCIUM 600 PO) Take 600 mg by mouth daily.  fish oil-dha-epa 1,200-144-216 mg cap Take  by mouth.  ascorbic acid, vitamin C, (VITAMIN C) 1,000 mg tablet Take  by mouth.  polyethylene glycol (MIRALAX) 17 gram packet Take 17 g by mouth daily.  cyanocobalamin 1,000 mcg tablet Take 1,000 mcg by mouth daily. Indications: PREVENTION OF VITAMIN B12 DEFICIENCY    dextran 70-hypromellose (ARTIFICIAL TEARS) ophthalmic solution Administer 2 Drops to both eyes as needed. Indications: DRY EYE     No current facility-administered medications for this visit. No Known Allergies    ROS:  Constitutional: No fever, chills or abnormal weight loss  Respiratory: No cough, SOB   CV: No chest pain or Palpitations    VS review:  Visit Vitals  /88   Pulse (!) 58   Temp 97.5 °F (36.4 °C) (Temporal)   Resp 20   Ht 5' 8\" (1.727 m)   Wt 136 lb 12.8 oz (62.1 kg)   SpO2 98%   BMI 20.80 kg/m²     -4#  Wt Readings from Last 3 Encounters:   11/16/21 136 lb 12.8 oz (62.1 kg)   10/04/21 140 lb 6.4 oz (63.7 kg)   06/25/21 144 lb 12.8 oz (65.7 kg)     BP Readings from Last 3 Encounters:   11/16/21 132/88   10/04/21 116/78   06/25/21 118/80       Objective:     General: alert, cooperative, no distress   Mental  status: mental status: alert, oriented to person, place, and time, normal mood, behavior, speech, dress, motor activity, and thought processes   Resp: resp: normal effort and no respiratory distress   Neuro: neuro: no gross deficits   EXT: No c/c/e. R knee with no erythema, edema, or bruising. POS crepitus and POS TTP to the femoral condyles. POS hamstring popping medially with extension of the knee        Assessment & Plan:   Adjustment disorder with depressed affect and weight loss  Not in goal control. Try boost prozac to 40mg/d. Work on nutrition. Will get colonoscopy soon with Dr. Tracey Montgomery for f/u polyps. Hx L Hip replacement  Healed well. Seeing Dr. Caro Boas clinic in [place]. We are now maintaining her pain meds. Con't to use the meloxicam 15mg/d, and resume the tramadol 50mg every day-BID, #100/ 3mo. PT notes she will make sure she spaces out the tramadol 6+ hours from any ambien dose. Anemia  Last CBC ok. Due to see DR. Thornton in [place] soon. Con't iron pills and recheck today, adj PRN. Colon polyps seen on last scope in [place]/2018, due for 3y followup this year. Fibromyalgia  well controlled. con't tramadol PRN and con't zanaflex. HTN/CKD3   BP good. Recheck labs today and adjust PRN    HLD  well controlled. con't current tx. Labs due summer 2022, adjust PRN. GERD  well controlled. con't current tx. OAB   Doing well on oxybutnin. Con't. F/U 2mo. 1. Have you been to the ER, urgent care clinic since your last visit? Hospitalized since your last visit? No    2. Have you seen or consulted any other health care providers outside of the [address] [place] [address] since your last visit? Include any pap smears or colon screening. No    Identified pt with two pt identifiers(name and ). Reviewed record in preparation for visit and have obtained necessary documentation.     Symptom review:    NO  Fever   NO  Shaking chills  NO  Cough  NO Headaches  YES  Body aches  NO  Coughing up blood  NO  Chest congestion  NO  Chest pain  NO  Shortness of breath  NO  Profound Loss of smell/taste  NO  Nausea/Vomiting   NO  Loose stool/Diarrhea  NO  any skin issues

## 2021-11-17 PROBLEM — I12.9 HYPERTENSIVE KIDNEY DISEASE WITH STAGE 3A CHRONIC KIDNEY DISEASE (HCC): Status: ACTIVE | Noted: 2019-07-24

## 2021-11-17 PROBLEM — N18.31 HYPERTENSIVE KIDNEY DISEASE WITH STAGE 3A CHRONIC KIDNEY DISEASE (HCC): Status: ACTIVE | Noted: 2019-07-24

## 2021-11-18 ENCOUNTER — TELEPHONE (OUTPATIENT)
Dept: FAMILY MEDICINE CLINIC | Age: 76
End: 2021-11-18

## 2022-02-14 ENCOUNTER — TELEPHONE (OUTPATIENT)
Dept: FAMILY MEDICINE CLINIC | Age: 77
End: 2022-02-14
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
## 2022-02-14 NOTE — TELEPHONE ENCOUNTER
Spoke with patient. She is doing okay. Just had bumps and bruises. She was told by ER to follow up with PCP about medications and frequent falls. Transferred to [facility] to schedule pt.

## 2022-02-14 NOTE — TELEPHONE ENCOUNTER
Got message from [facility] that pt seen in the ER there for eval s/p fall, she tripped getting out of a scooter at a local shop. No major injury seen on eval, scans, just contusions. Please check status.

## 2022-02-18 ENCOUNTER — OFFICE VISIT (OUTPATIENT)
Dept: FAMILY MEDICINE CLINIC | Age: 77
End: 2022-02-18
Payer: MEDICARE

## 2022-02-18 VITALS
BODY MASS INDEX: 21.86 KG/M2 | DIASTOLIC BLOOD PRESSURE: 70 MMHG | HEART RATE: 67 BPM | HEIGHT: 68 IN | WEIGHT: 144.25 LBS | TEMPERATURE: 97.8 F | SYSTOLIC BLOOD PRESSURE: 130 MMHG | OXYGEN SATURATION: 95 % | RESPIRATION RATE: 18 BRPM
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
## 2022-02-18 DIAGNOSIS — M79.7 FIBROMYALGIA: ICD-10-CM

## 2022-02-18 DIAGNOSIS — I10 PRIMARY HYPERTENSION: ICD-10-CM

## 2022-02-18 DIAGNOSIS — N18.31 HYPERTENSIVE KIDNEY DISEASE WITH STAGE 3A CHRONIC KIDNEY DISEASE (HCC): ICD-10-CM

## 2022-02-18 DIAGNOSIS — I12.9 HYPERTENSIVE KIDNEY DISEASE WITH STAGE 3A CHRONIC KIDNEY DISEASE (HCC): ICD-10-CM

## 2022-02-18 DIAGNOSIS — M47.816 LUMBAR SPONDYLOSIS: ICD-10-CM

## 2022-02-18 DIAGNOSIS — E78.00 HIGH CHOLESTEROL: ICD-10-CM

## 2022-02-18 PROCEDURE — 99214 OFFICE O/P EST MOD 30 MIN: CPT | Performed by: FAMILY MEDICINE

## 2022-02-18 RX ORDER — TIZANIDINE 4 MG/1
TABLET ORAL
Qty: 30 TABLET | Refills: 2
Start: 2022-02-18 | End: 2022-07-20

## 2022-02-18 NOTE — PROGRESS NOTES
Chief Complaint   Patient presents with    Follow-up    Fall     Luke Salgado is a 68 y.o. female     Subjective:     PT had fall in parking lot of a local shop. Got some bruising. Went to ED at [facility], had eval, CT, xrays, no fx, just soft tissue injury. L shoulder more sore and bruised, but can lift arm ok. Depression  Feeling so/so lately. Sleeping ok. Still grieveing after losing several family members over past few months. Decreased appetite. 3 most recent PHQ Screens 2/18/2022   PHQ Not Done -   Little interest or pleasure in doing things Not at all   Feeling down, depressed, irritable, or hopeless Not at all   Total Score PHQ 2 0   Trouble falling or staying asleep, or sleeping too much -   Feeling tired or having little energy -   Poor appetite, weight loss, or overeating -   Feeling bad about yourself - or that you are a failure or have let yourself or your family down -   Trouble concentrating on things such as school, work, reading, or watching TV -   Moving or speaking so slowly that other people could have noticed; or the opposite being so fidgety that others notice -   Thoughts of being better off dead, or hurting yourself in some way -   PHQ 9 Score -       Hypertension and CKD3. Blood pressures have been stable/in goal. Management at last visit included continuing current regimen . Current regimen: angiotensin II receptor antagonist, calcium channel blocker and thiazide diuretic. Symptoms include no symptoms. Patient denies chest pain, palpitations, peripheral edema, headache.     Lab review:   Lab Results   Component Value Date/Time    Sodium 136 10/04/2021 11:28 AM    Potassium 4.2 10/04/2021 11:28 AM    Chloride 103 10/04/2021 11:28 AM    CO2 29 10/04/2021 11:28 AM    Anion gap 4 (L) 10/04/2021 11:28 AM    Glucose 78 10/04/2021 11:28 AM    BUN 21 (H) 10/04/2021 11:28 AM    Creatinine 1.21 (H) 10/04/2021 11:28 AM    BUN/Creatinine ratio 17 10/04/2021 11:28 AM    GFR est AA 53 (L) 10/04/2021 11:28 AM    GFR est non-AA 43 (L) 10/04/2021 11:28 AM    Calcium 9.8 10/04/2021 11:28 AM     Hyperlipidemia. On lipitor 40. Linda well. No myalgias, arthralgias, unusual weakness. Lab Results   Component Value Date/Time    Cholesterol, total 164 06/25/2021 09:47 AM    HDL Cholesterol 70 06/25/2021 09:47 AM    LDL, calculated 70.2 06/25/2021 09:47 AM    VLDL, calculated 23.8 06/25/2021 09:47 AM    Triglyceride 119 06/25/2021 09:47 AM    CHOL/HDL Ratio 2.3 06/25/2021 09:47 AM     Lab Results   Component Value Date/Time    ALT (SGPT) 21 06/25/2021 09:47 AM    Alk. phosphatase 108 06/25/2021 09:47 AM    Bilirubin, total 0.6 06/25/2021 09:47 AM     GERD  On prilosec. Working well. Fibromyalgia  Remains on prozac, flexeril, gabapentin, mobic. Remains on tramadol. Prev been Managed by Rheum Dr. Presley Renner MD., rheumatologist at [facility], and Ortho at Dr. Belvia Bosworth [facility] in [address] [facility]. Hyperlipidemia. On lipitor 40. Linda well. No myalgias, arthralgias, unusual weakness. Lab Results   Component Value Date/Time    Cholesterol, total 164 06/25/2021 09:47 AM    HDL Cholesterol 70 06/25/2021 09:47 AM    LDL, calculated 70.2 06/25/2021 09:47 AM    VLDL, calculated 23.8 06/25/2021 09:47 AM    Triglyceride 119 06/25/2021 09:47 AM    CHOL/HDL Ratio 2.3 06/25/2021 09:47 AM       Lab Results   Component Value Date/Time    ALT (SGPT) 21 06/25/2021 09:47 AM    Alk. phosphatase 108 06/25/2021 09:47 AM    Bilirubin, total 0.6 06/25/2021 09:47 AM         PMH, SH, Medications/Allergies: reviewed, on chart. Current Outpatient Medications   Medication Sig    FLUoxetine (PROzac) 40 mg capsule Take 1 Capsule by mouth daily.  amLODIPine (NORVASC) 10 mg tablet TAKE 1 TABLET BY MOUTH EVERY DAY FOR PRESSURE    losartan (COZAAR) 50 mg tablet TAKE 1 TABLET BY MOUTH ONCE DAILY FOR BLOOD PRESSURE    omega 3-dha-epa-fish oil (Fish OiL) 100-160-1,000 mg cap Take 1,000 mg by mouth daily.     zolpidem (AMBIEN) 10 mg tablet TAKE 1 TABLET BY MOUTH EVERY NIGHT. MAX DAILY AMOUNT: 10 MG    metoprolol succinate (TOPROL-XL) 50 mg XL tablet TAKE 1 AND 1/2 TABLETS BY MOUTH EVERY DAY FOR BLOOD PRESSURE    omeprazole (PRILOSEC) 40 mg capsule TAKE 1 CAPSULE BY MOUTH DAILY    tiZANidine (ZANAFLEX) 4 mg tablet TAKE 1 TABLET BY MOUTH THREE TIMES DAILY AS NEEDED FOR SPASM    oxybutynin (DITROPAN) 5 mg tablet TAKE 1 TABLET BY MOUTH TWICE DAILY FOR BLADDER    atorvastatin (LIPITOR) 40 mg tablet take 1 tablet by mouth once daily for heart and cholesterol    chlorthalidone (HYGROTON) 25 mg tablet TAKE 1 TABLET BY MOUTH ONCE DAILY for pressure    meloxicam (MOBIC) 15 mg tablet TAKE 1 TABLET BY MOUTH EVERY DAY WITH FOOD AS NEEDED FOR PAIN    potassium chloride (K-DUR, KLOR-CON) 20 mEq tablet TAKE 1 TABLET BY MOUTH EVERY DAY    cetirizine (ZYRTEC) 5 mg tablet Take 5 mg by mouth daily.  vitamin E (AQUA GEMS) 400 unit capsule Take 400 Units by mouth daily.  aspirin 81 mg chewable tablet Take 81 mg by mouth daily.  gabapentin (NEURONTIN) 300 mg capsule take 1 capsule by mouth three times a day  Indications: nerve pain    cholecalciferol, VITAMIN D3, (VITAMIN D3) 5,000 unit tab tablet Take  by mouth daily.  flaxseed 1,000 mg cap Take 1,000 mg by mouth daily.  morinda citrifolia fruit 250 mg cap Take 250 mg by mouth.  MV,FE,MIN/LUTEIN (CENTRUM SILVER ULTRA WOMEN'S PO) Take  by mouth.  CALCIUM CARBONATE (CALCIUM 600 PO) Take 600 mg by mouth daily.  fish oil-dha-epa 1,200-144-216 mg cap Take  by mouth.  ascorbic acid, vitamin C, (VITAMIN C) 1,000 mg tablet Take  by mouth.  polyethylene glycol (MIRALAX) 17 gram packet Take 17 g by mouth daily.  cyanocobalamin 1,000 mcg tablet Take 1,000 mcg by mouth daily. Indications: PREVENTION OF VITAMIN B12 DEFICIENCY    dextran 70-hypromellose (ARTIFICIAL TEARS) ophthalmic solution Administer 2 Drops to both eyes as needed.  Indications: DRY EYE     No current facility-administered medications for this visit. No Known Allergies    ROS:  Constitutional: No fever, chills or abnormal weight loss  Respiratory: No cough, SOB   CV: No chest pain or Palpitations    VS review:  Visit Vitals  /70 (BP 1 Location: Right arm)   Pulse 67   Temp 97.8 °F (36.6 °C) (Temporal)   Resp 18   Ht 5' 8\" (1.727 m)   Wt 144 lb 4 oz (65.4 kg)   SpO2 95%   BMI 21.93 kg/m²     -8#  Wt Readings from Last 3 Encounters:   02/18/22 144 lb 4 oz (65.4 kg)   11/16/21 136 lb 12.8 oz (62.1 kg)   10/04/21 140 lb 6.4 oz (63.7 kg)     BP Readings from Last 3 Encounters:   02/18/22 130/70   11/16/21 132/88   10/04/21 116/78       Objective:     General: alert, cooperative, no distress   Mental  status: mental status: alert, oriented to person, place, and time, normal mood, behavior, speech, dress, motor activity, and thought processes   Resp: resp: normal effort and no respiratory distress   Neuro: neuro: no gross deficits   EXT: No c/c/e. R knee with no erythema, edema. POS bruising to L orbital ridge, L anterior shoulder with normal ROM, minimal discomfort, and L knee. Assessment & Plan:       Adjustment disorder with depressed affect and weight loss  Improving but still stymptomatic. Con't on prozac 40mg/d. Con't to work on nutrition. Hx colon polyp  Will get colonoscopy soon with Dr. Keara Mello for f/u polyps. Hx L Hip replacement, L shoulder pain, R shoulder pain  Healed well. Seeing Dr. Joanna Lyles clinic in [address] [facility]. We are now maintaining her pain meds. Con't to use the meloxicam 15mg/d. Off tramadol lately. Rec physical therapy, pt will consider, did well with Connie in past.    Anemia  Last CBC ok. Recheck today. Due to see DR. Thornton in [address] [facility] soon, adj PRN. Colon polyps seen on last scope in [address] [facility] 8/2018, due for 3y followup this year. Fibromyalgia  well controlled. con't zanaflex, but use lower dose due to balance issues, 2mg/d. HTN/CKD3   BP good.  Recheck labs today and adjust PRN    HLD  well controlled. con't current tx. Check Labs, adjust PRN. GERD  well controlled. con't current tx. OAB   Doing well on oxybutnin. Con't. F/U 3mo. 1. Have you been to the ER, urgent care clinic since your last visit? Hospitalized since your last visit? yes    2. Have you seen or consulted any other health care providers outside of the [address] [place] [address] since your last visit? Include any pap smears or colon screening.  no

## 2022-02-19 LAB
ALBUMIN SERPL-MCNC: 3.9 G/DL (ref 3.5–5)
ALBUMIN/GLOB SERPL: 1.2 {RATIO} (ref 1.1–2.2)
ALP SERPL-CCNC: 90 U/L (ref 45–117)
ALT SERPL-CCNC: 23 U/L (ref 12–78)
ANION GAP SERPL CALC-SCNC: 6 MMOL/L (ref 5–15)
AST SERPL-CCNC: 18 U/L (ref 15–37)
BASOPHILS # BLD: 0 K/UL (ref 0–0.1)
BASOPHILS NFR BLD: 1 % (ref 0–1)
BILIRUB SERPL-MCNC: 0.5 MG/DL (ref 0.2–1)
BUN SERPL-MCNC: 21 MG/DL (ref 6–20)
BUN/CREAT SERPL: 21 (ref 12–20)
CALCIUM SERPL-MCNC: 10.1 MG/DL (ref 8.5–10.1)
CHLORIDE SERPL-SCNC: 102 MMOL/L (ref 97–108)
CHOLEST SERPL-MCNC: 183 MG/DL
CO2 SERPL-SCNC: 28 MMOL/L (ref 21–32)
CREAT SERPL-MCNC: 1 MG/DL (ref 0.55–1.02)
DIFFERENTIAL METHOD BLD: ABNORMAL
EOSINOPHIL # BLD: 0 K/UL (ref 0–0.4)
EOSINOPHIL NFR BLD: 0 % (ref 0–7)
ERYTHROCYTE [DISTWIDTH] IN BLOOD BY AUTOMATED COUNT: 12.1 % (ref 11.5–14.5)
GLOBULIN SER CALC-MCNC: 3.2 G/DL (ref 2–4)
GLUCOSE SERPL-MCNC: 98 MG/DL (ref 65–100)
HCT VFR BLD AUTO: 34.3 % (ref 35–47)
HDLC SERPL-MCNC: 89 MG/DL
HDLC SERPL: 2.1 {RATIO} (ref 0–5)
HGB BLD-MCNC: 10.8 G/DL (ref 11.5–16)
IMM GRANULOCYTES # BLD AUTO: 0 K/UL (ref 0–0.04)
IMM GRANULOCYTES NFR BLD AUTO: 0 % (ref 0–0.5)
LDLC SERPL CALC-MCNC: 76.4 MG/DL (ref 0–100)
LYMPHOCYTES # BLD: 2.1 K/UL (ref 0.8–3.5)
LYMPHOCYTES NFR BLD: 24 % (ref 12–49)
MCH RBC QN AUTO: 30.3 PG (ref 26–34)
MCHC RBC AUTO-ENTMCNC: 31.5 G/DL (ref 30–36.5)
MCV RBC AUTO: 96.1 FL (ref 80–99)
MONOCYTES # BLD: 0.8 K/UL (ref 0–1)
MONOCYTES NFR BLD: 10 % (ref 5–13)
NEUTS SEG # BLD: 5.6 K/UL (ref 1.8–8)
NEUTS SEG NFR BLD: 65 % (ref 32–75)
NRBC # BLD: 0 K/UL (ref 0–0.01)
NRBC BLD-RTO: 0 PER 100 WBC
PLATELET # BLD AUTO: 414 K/UL (ref 150–400)
PMV BLD AUTO: 10 FL (ref 8.9–12.9)
POTASSIUM SERPL-SCNC: 4 MMOL/L (ref 3.5–5.1)
PROT SERPL-MCNC: 7.1 G/DL (ref 6.4–8.2)
RBC # BLD AUTO: 3.57 M/UL (ref 3.8–5.2)
SODIUM SERPL-SCNC: 136 MMOL/L (ref 136–145)
TRIGL SERPL-MCNC: 88 MG/DL (ref ?–150)
VLDLC SERPL CALC-MCNC: 17.6 MG/DL
WBC # BLD AUTO: 8.6 K/UL (ref 3.6–11)

## 2022-03-18 PROBLEM — N18.31 HYPERTENSIVE KIDNEY DISEASE WITH STAGE 3A CHRONIC KIDNEY DISEASE (HCC): Status: ACTIVE | Noted: 2019-07-24

## 2022-03-18 PROBLEM — S72.002A CLOSED FRACTURE OF NECK OF LEFT FEMUR (HCC): Status: ACTIVE | Noted: 2020-10-16

## 2022-03-18 PROBLEM — I12.9 HYPERTENSIVE KIDNEY DISEASE WITH STAGE 3A CHRONIC KIDNEY DISEASE (HCC): Status: ACTIVE | Noted: 2019-07-24

## 2022-03-20 PROBLEM — I10 HYPERTENSION: Status: ACTIVE | Noted: 2020-10-16

## 2022-05-20 ENCOUNTER — OFFICE VISIT (OUTPATIENT)
Dept: FAMILY MEDICINE CLINIC | Age: 77
End: 2022-05-20
Payer: MEDICARE

## 2022-05-20 VITALS
DIASTOLIC BLOOD PRESSURE: 78 MMHG | BODY MASS INDEX: 21.43 KG/M2 | WEIGHT: 141.4 LBS | RESPIRATION RATE: 20 BRPM | SYSTOLIC BLOOD PRESSURE: 132 MMHG | HEIGHT: 68 IN | OXYGEN SATURATION: 100 % | HEART RATE: 47 BPM | TEMPERATURE: 97.1 F

## 2022-05-20 DIAGNOSIS — F51.01 PRIMARY INSOMNIA: ICD-10-CM

## 2022-05-20 DIAGNOSIS — R26.9 GAIT ABNORMALITY: ICD-10-CM

## 2022-05-20 DIAGNOSIS — N32.81 OAB (OVERACTIVE BLADDER): ICD-10-CM

## 2022-05-20 DIAGNOSIS — K21.9 GASTROESOPHAGEAL REFLUX DISEASE WITHOUT ESOPHAGITIS: ICD-10-CM

## 2022-05-20 DIAGNOSIS — I10 PRIMARY HYPERTENSION: ICD-10-CM

## 2022-05-20 DIAGNOSIS — I12.9 HYPERTENSIVE KIDNEY DISEASE WITH STAGE 3A CHRONIC KIDNEY DISEASE (HCC): ICD-10-CM

## 2022-05-20 DIAGNOSIS — D50.0 IRON DEFICIENCY ANEMIA DUE TO CHRONIC BLOOD LOSS: ICD-10-CM

## 2022-05-20 DIAGNOSIS — N18.31 STAGE 3A CHRONIC KIDNEY DISEASE (HCC): ICD-10-CM

## 2022-05-20 DIAGNOSIS — Z12.31 BREAST CANCER SCREENING BY MAMMOGRAM: ICD-10-CM

## 2022-05-20 DIAGNOSIS — N18.31 HYPERTENSIVE KIDNEY DISEASE WITH STAGE 3A CHRONIC KIDNEY DISEASE (HCC): ICD-10-CM

## 2022-05-20 DIAGNOSIS — M79.7 FIBROMYALGIA: ICD-10-CM

## 2022-05-20 DIAGNOSIS — D12.6 ADENOMATOUS POLYP OF COLON, UNSPECIFIED PART OF COLON: Primary | ICD-10-CM

## 2022-05-20 PROBLEM — N18.30 CHRONIC RENAL DISEASE, STAGE III (HCC): Status: ACTIVE | Noted: 2022-05-20

## 2022-05-20 PROCEDURE — 99214 OFFICE O/P EST MOD 30 MIN: CPT | Performed by: FAMILY MEDICINE

## 2022-05-20 RX ORDER — OMEPRAZOLE 40 MG/1
40 CAPSULE, DELAYED RELEASE ORAL DAILY
Qty: 90 CAPSULE | Refills: 3 | Status: SHIPPED | OUTPATIENT
Start: 2022-05-20

## 2022-05-20 RX ORDER — POTASSIUM CHLORIDE 20 MEQ/1
20 TABLET, EXTENDED RELEASE ORAL DAILY
Qty: 90 TABLET | Refills: 3 | Status: SHIPPED | OUTPATIENT
Start: 2022-05-20

## 2022-05-20 NOTE — PROGRESS NOTES
Chief Complaint   Patient presents with    Hypertension     3 month follow up    GERD    Chronic Kidney Disease     Nikolai Russell is a 68 y.o. female     Subjective:     Depression  Feeling better lately. Sleeping ok. Still grieveing after losing several family members over past few months. Decreased appetite. 3 most recent PHQ Screens 5/20/2022   PHQ Not Done -   Little interest or pleasure in doing things Not at all   Feeling down, depressed, irritable, or hopeless Not at all   Total Score PHQ 2 0   Trouble falling or staying asleep, or sleeping too much -   Feeling tired or having little energy -   Poor appetite, weight loss, or overeating -   Feeling bad about yourself - or that you are a failure or have let yourself or your family down -   Trouble concentrating on things such as school, work, reading, or watching TV -   Moving or speaking so slowly that other people could have noticed; or the opposite being so fidgety that others notice -   Thoughts of being better off dead, or hurting yourself in some way -   PHQ 9 Score -       Hypertension and CKD3. Blood pressures have been stable/in goal. Management at last visit included continuing current regimen . Current regimen: angiotensin II receptor antagonist, calcium channel blocker and thiazide diuretic. Symptoms include no symptoms. Patient denies chest pain, palpitations, peripheral edema, headache. Lab review:   Lab Results   Component Value Date/Time    Sodium 136 02/18/2022 01:24 PM    Potassium 4.0 02/18/2022 01:24 PM    Chloride 102 02/18/2022 01:24 PM    CO2 28 02/18/2022 01:24 PM    Anion gap 6 02/18/2022 01:24 PM    Glucose 98 02/18/2022 01:24 PM    BUN 21 (H) 02/18/2022 01:24 PM    Creatinine 1.00 02/18/2022 01:24 PM    BUN/Creatinine ratio 21 (H) 02/18/2022 01:24 PM    GFR est AA >60 02/18/2022 01:24 PM    GFR est non-AA 54 (L) 02/18/2022 01:24 PM    Calcium 10.1 02/18/2022 01:24 PM     Hyperlipidemia. On lipitor 40. Linda well.  No myalgias, arthralgias, unusual weakness. Lab Results   Component Value Date/Time    Cholesterol, total 183 02/18/2022 01:24 PM    HDL Cholesterol 89 02/18/2022 01:24 PM    LDL, calculated 76.4 02/18/2022 01:24 PM    VLDL, calculated 17.6 02/18/2022 01:24 PM    Triglyceride 88 02/18/2022 01:24 PM    CHOL/HDL Ratio 2.1 02/18/2022 01:24 PM     Lab Results   Component Value Date/Time    ALT (SGPT) 23 02/18/2022 01:24 PM    Alk. phosphatase 90 02/18/2022 01:24 PM    Bilirubin, total 0.5 02/18/2022 01:24 PM     GERD  On prilosec. Working well. Fibromyalgia  Remains on prozac, flexeril, gabapentin, mobic. Prev been Managed by Rheum Dr. Robyn Camacho MD., rheumatologist at [facility], and Ortho at Dr. Sherry csott in [address] [place] [address]. Hyperlipidemia. On lipitor 40. Linda well. No myalgias, arthralgias, unusual weakness. Lab Results   Component Value Date/Time    Cholesterol, total 183 02/18/2022 01:24 PM    HDL Cholesterol 89 02/18/2022 01:24 PM    LDL, calculated 76.4 02/18/2022 01:24 PM    VLDL, calculated 17.6 02/18/2022 01:24 PM    Triglyceride 88 02/18/2022 01:24 PM    CHOL/HDL Ratio 2.1 02/18/2022 01:24 PM       Lab Results   Component Value Date/Time    ALT (SGPT) 23 02/18/2022 01:24 PM    Alk. phosphatase 90 02/18/2022 01:24 PM    Bilirubin, total 0.5 02/18/2022 01:24 PM         PMH, SH, Medications/Allergies: reviewed, on chart. Current Outpatient Medications   Medication Sig    zolpidem (AMBIEN) 10 mg tablet Take 1 Tablet by mouth nightly as needed for Sleep. Max Daily Amount: 10 mg.    tiZANidine (ZANAFLEX) 4 mg tablet TAKE 1/2 TABLET BY MOUTH THREE TIMES DAILY AS NEEDED FOR SPASM    FLUoxetine (PROzac) 40 mg capsule Take 1 Capsule by mouth daily.  amLODIPine (NORVASC) 10 mg tablet TAKE 1 TABLET BY MOUTH EVERY DAY FOR PRESSURE    losartan (COZAAR) 50 mg tablet TAKE 1 TABLET BY MOUTH ONCE DAILY FOR BLOOD PRESSURE    omega 3-dha-epa-fish oil (Fish OiL) 100-160-1,000 mg cap Take 1,000 mg by mouth daily.     metoprolol succinate (TOPROL-XL) 50 mg XL tablet TAKE 1 AND 1/2 TABLETS BY MOUTH EVERY DAY FOR BLOOD PRESSURE    omeprazole (PRILOSEC) 40 mg capsule TAKE 1 CAPSULE BY MOUTH DAILY    oxybutynin (DITROPAN) 5 mg tablet TAKE 1 TABLET BY MOUTH TWICE DAILY FOR BLADDER    atorvastatin (LIPITOR) 40 mg tablet take 1 tablet by mouth once daily for heart and cholesterol    chlorthalidone (HYGROTON) 25 mg tablet TAKE 1 TABLET BY MOUTH ONCE DAILY for pressure    meloxicam (MOBIC) 15 mg tablet TAKE 1 TABLET BY MOUTH EVERY DAY WITH FOOD AS NEEDED FOR PAIN    potassium chloride (K-DUR, KLOR-CON) 20 mEq tablet TAKE 1 TABLET BY MOUTH EVERY DAY    cetirizine (ZYRTEC) 5 mg tablet Take 5 mg by mouth daily.  vitamin E (AQUA GEMS) 400 unit capsule Take 400 Units by mouth daily.  aspirin 81 mg chewable tablet Take 81 mg by mouth daily.  gabapentin (NEURONTIN) 300 mg capsule take 1 capsule by mouth three times a day  Indications: nerve pain    cholecalciferol, VITAMIN D3, (VITAMIN D3) 5,000 unit tab tablet Take  by mouth daily.  flaxseed 1,000 mg cap Take 1,000 mg by mouth daily.  morinda citrifolia fruit 250 mg cap Take 250 mg by mouth.  MV,FE,MIN/LUTEIN (CENTRUM SILVER ULTRA WOMEN'S PO) Take  by mouth.  CALCIUM CARBONATE (CALCIUM 600 PO) Take 600 mg by mouth daily.  fish oil-dha-epa 1,200-144-216 mg cap Take  by mouth.  ascorbic acid, vitamin C, (VITAMIN C) 1,000 mg tablet Take  by mouth.  polyethylene glycol (MIRALAX) 17 gram packet Take 17 g by mouth daily.  cyanocobalamin 1,000 mcg tablet Take 1,000 mcg by mouth daily. Indications: PREVENTION OF VITAMIN B12 DEFICIENCY    dextran 70-hypromellose (ARTIFICIAL TEARS) ophthalmic solution Administer 2 Drops to both eyes as needed. Indications: DRY EYE     No current facility-administered medications for this visit.       No Known Allergies    ROS:  Constitutional: No fever, chills or abnormal weight loss  Respiratory: No cough, SOB   CV: No chest pain or Palpitations    VS review:  Visit Vitals  /78   Pulse (!) 47   Temp 97.1 °F (36.2 °C) (Temporal)   Resp 20   Ht 5' 8\" (1.727 m)   Wt 141 lb 6.4 oz (64.1 kg)   SpO2 100%   BMI 21.50 kg/m²     -3#  Wt Readings from Last 3 Encounters:   05/20/22 141 lb 6.4 oz (64.1 kg)   02/18/22 144 lb 4 oz (65.4 kg)   11/16/21 136 lb 12.8 oz (62.1 kg)     BP Readings from Last 3 Encounters:   05/20/22 132/78   02/18/22 130/70   11/16/21 132/88       Objective:     General: alert, cooperative, no distress   Mental  status: mental status: alert, oriented to person, place, and time, normal mood, behavior, speech, dress, motor activity, and thought processes   Resp: resp: normal effort and no respiratory distress   Neuro: neuro: no gross deficits   EXT: No c/c/e. Assessment & Plan:     Adjustment disorder with depressed affect  In goal. Con't on prozac 40mg/d. Con't to work on nutrition. Hx colon polyp  Will get colonoscopy soon with Dr. Ana Paula Rothman for f/u polyps. Hx L Hip replacement, L shoulder pain, R shoulder pain  Healed well. Seeing Dr. Eunice Grajeda clinic in [address] [place] [address] Rd. We are now maintaining her pain meds. Con't to use the meloxicam 15mg/d. Off tramadol lately due to using ambien. Rec physical therapy, pt will consider, did well with Connie in past.    Anemia  Last CBC a little low again. Recheck today. Due to see DR. Thornton in [facility] soon, pt wtill working on getting that done. Colon polyps seen on last scope in [address] [place] [address] Rd 8/2018, due for 3y followup this year. Fibromyalgia  well controlled. con't zanaflex, but use lower dose due to balance issues, 2mg/d. HTN/CKD3   BP good. Recheck labs today and adjust PRN    HLD  well controlled. con't current tx. Check Labs, adjust PRN. GERD  well controlled. con't current tx. OAB   Doing a little less well with the oxybutnin 5mg. Discussed options. F/U 3mo. 1. Have you been to the ER, urgent care clinic since your last visit?   Hospitalized since your last visit? yes    2. Have you seen or consulted any other health care providers outside of the [address] [place] [address] since your last visit? Include any pap smears or colon screening.  no

## 2022-05-20 NOTE — PROGRESS NOTES
Mariela Bello is a 68 y.o. female presenting for/with:    Chief Complaint   Patient presents with    Hypertension     3 month follow up    GERD    Chronic Kidney Disease       Visit Vitals  /78   Pulse (!) 47   Temp 97.1 °F (36.2 °C) (Temporal)   Resp 20   Ht 5' 8\" (1.727 m)   Wt 141 lb 6.4 oz (64.1 kg)   SpO2 100%   BMI 21.50 kg/m²     Pain Scale: /10  Pain Location:     1. \"Have you been to the ER, urgent care clinic since your last visit? Hospitalized since your last visit? \" Yes Where: [facility]    2. \"Have you seen or consulted any other health care providers outside of the [address] [place] [address] since your last visit? \" No     3. For patients aged 39-70: Has the patient had a colonoscopy / FIT/ Cologuard? NA - based on age      If the patient is female:    4. For patients aged 41-77: Has the patient had a mammogram within the past 2 years? NA - based on age or sex      11. For patients aged 21-65: Has the patient had a pap smear?  NA - based on age or sex      Symptom review:  NO  Fever   NO  Shaking chills  NO  Cough  NO  Body aches  NO  Coughing up blood  NO  Chest congestion  NO  Chest pain  NO  Shortness of breath  NO  Profound Loss of smell/taste  NO  Nausea/Vomiting   NO  Loose stool/Diarrhea  NO  any skin issues    Patient Risk Factors Reviewed as follows:  NO  have you been in Close contact with confirmed COVID19 patient   NO  History of recent travel to affected geographical areas within the past 14 days  NO  COPD  NO  Active Cancer/Leukemia/Lymphoma/Chemotherapy  NO  Oral steroid use  NO  Pregnant  NO  Diabetes Mellitus  NO  Heart disease  NO  Asthma  NO Health care worker at home  NO Health care worker  NO Is there a Pregnant Woman in the home  NO Dialysis pt in the home   NO a large number of people living in the home    Learning Assessment 2/18/2022   PRIMARY LEARNER Patient   PRIMARY LANGUAGE ENGLISH   LEARNER PREFERENCE PRIMARY DEMONSTRATION   ANSWERED BY patient   RELATIONSHIP SELF Fall Risk Assessment, last 12 mths 5/20/2022   Able to walk? Yes   Fall in past 12 months? 1   Do you feel unsteady? 1   Are you worried about falling 1   Is TUG test greater than 12 seconds? -   Is the gait abnormal? 1   Number of falls in past 12 months 2   Fall with injury? 1       3 most recent PHQ Screens 5/20/2022   PHQ Not Done -   Little interest or pleasure in doing things Not at all   Feeling down, depressed, irritable, or hopeless Not at all   Total Score PHQ 2 0   Trouble falling or staying asleep, or sleeping too much -   Feeling tired or having little energy -   Poor appetite, weight loss, or overeating -   Feeling bad about yourself - or that you are a failure or have let yourself or your family down -   Trouble concentrating on things such as school, work, reading, or watching TV -   Moving or speaking so slowly that other people could have noticed; or the opposite being so fidgety that others notice -   Thoughts of being better off dead, or hurting yourself in some way -   PHQ 9 Score -     Abuse Screening Questionnaire 5/20/2022   Do you ever feel afraid of your partner? N   Are you in a relationship with someone who physically or mentally threatens you? N   Is it safe for you to go home?  Y       ADL Assessment 5/20/2022   Feeding yourself No Help Needed   Getting from bed to chair No Help Needed   Getting dressed No Help Needed   Bathing or showering No Help Needed   Walk across the room (includes cane/walker) No Help Needed   Using the telphone No Help Needed   Taking your medications No Help Needed   Preparing meals No Help Needed   Managing money (expenses/bills) No Help Needed   Moderately strenuous housework (laundry) No Help Needed   Shopping for personal items (toiletries/medicines) No Help Needed   Shopping for groceries No Help Needed   Driving No Help Needed   Climbing a flight of stairs No Help Needed   Getting to places beyond walking distances No Help Needed      Advance Care Planning 5/20/2022   Patient's Healthcare Decision Maker is: Named in scanned ACP document   Primary Decision Maker Name -   Secondary Decision Maker Name -   Secondary Decision Maker Relationship to Patient -   Confirm Advance Directive Yes, on file   Patient Would Like to Complete Advance Directive -

## 2022-05-21 LAB
BASOPHILS # BLD: 0.1 K/UL (ref 0–0.1)
BASOPHILS NFR BLD: 1 % (ref 0–1)
DIFFERENTIAL METHOD BLD: ABNORMAL
EOSINOPHIL # BLD: 0.3 K/UL (ref 0–0.4)
EOSINOPHIL NFR BLD: 4 % (ref 0–7)
ERYTHROCYTE [DISTWIDTH] IN BLOOD BY AUTOMATED COUNT: 13 % (ref 11.5–14.5)
HCT VFR BLD AUTO: 36.2 % (ref 35–47)
HGB BLD-MCNC: 11 G/DL (ref 11.5–16)
IMM GRANULOCYTES # BLD AUTO: 0.1 K/UL (ref 0–0.04)
IMM GRANULOCYTES NFR BLD AUTO: 1 % (ref 0–0.5)
IRON SATN MFR SERPL: 24 % (ref 20–50)
IRON SERPL-MCNC: 84 UG/DL (ref 35–150)
LYMPHOCYTES # BLD: 2 K/UL (ref 0.8–3.5)
LYMPHOCYTES NFR BLD: 28 % (ref 12–49)
MCH RBC QN AUTO: 31.3 PG (ref 26–34)
MCHC RBC AUTO-ENTMCNC: 30.4 G/DL (ref 30–36.5)
MCV RBC AUTO: 102.8 FL (ref 80–99)
MONOCYTES # BLD: 0.7 K/UL (ref 0–1)
MONOCYTES NFR BLD: 9 % (ref 5–13)
NEUTS SEG # BLD: 4.2 K/UL (ref 1.8–8)
NEUTS SEG NFR BLD: 57 % (ref 32–75)
NRBC # BLD: 0 K/UL (ref 0–0.01)
NRBC BLD-RTO: 0 PER 100 WBC
PLATELET # BLD AUTO: 298 K/UL (ref 150–400)
PMV BLD AUTO: 10 FL (ref 8.9–12.9)
RBC # BLD AUTO: 3.52 M/UL (ref 3.8–5.2)
TIBC SERPL-MCNC: 347 UG/DL (ref 250–450)
WBC # BLD AUTO: 7.3 K/UL (ref 3.6–11)

## 2022-05-28 NOTE — PROGRESS NOTES
Jannet Red is a 70 y.o. female presenting for/with:    Urinary Frequency (dribbles) and Blood Pressure Check (check up)    HPI  Pt reports gradually worsening urinary urgency over past 6 mo. No med changes in past year. Has been drinking a little more fluids after 8pm lately, though. Hasn't tried volume restriction in the evening though. Also has noticed urgency during the day for past 6mo. Last GYN exam was around 2015 at [facility]. Hypertension. Blood pressures have been stable/in goal. Management at last visit included continuing current regimen . Current regimen: angiotensin II receptor antagonist, calcium channel blocker and thiazide diuretic. Symptoms include no symptoms. Patient denies chest pain, palpitations, peripheral edema, headache. Lab review:   Lab Results   Component Value Date/Time    Sodium 141 07/25/2016 10:47 AM    Potassium 4.0 07/25/2016 10:47 AM    Chloride 100 07/25/2016 10:47 AM    CO2 27 07/25/2016 10:47 AM    Anion gap 9 05/05/2015 03:36 AM    Glucose 89 07/25/2016 10:47 AM    BUN 20 07/25/2016 10:47 AM    Creatinine 1.18 07/25/2016 10:47 AM    BUN/Creatinine ratio 17 07/25/2016 10:47 AM    GFR est AA 54 07/25/2016 10:47 AM    GFR est non-AA 47 07/25/2016 10:47 AM    Calcium 9.7 07/25/2016 10:47 AM     Hyperlipidemia. On lipitor 40. Linda well. No myalgias, arthralgias, unusual weakness. Lab Results   Component Value Date/Time    Cholesterol, total 199 01/26/2016 10:44 AM    HDL Cholesterol 80 01/26/2016 10:44 AM    LDL, calculated 99 01/26/2016 10:44 AM    VLDL, calculated 20 01/26/2016 10:44 AM    Triglyceride 102 01/26/2016 10:44 AM       Lab Results   Component Value Date/Time    ALT (SGPT) 16 07/25/2016 10:47 AM    AST (SGOT) 17 07/25/2016 10:47 AM    Alk. phosphatase 104 07/25/2016 10:47 AM    Bilirubin, total 0.3 07/25/2016 10:47 AM     GERD  On prilosec. Working well. Fibromyalgia  Remains on prozac, flexeril, gabapentin, tramadol.  Managed by Rheum  Sandy Johnston MD., rheumatologist at [facility]. Still getting her tramadol filled with [facility], ok with that. PMH, SH, Medications/Allergies: reviewed, on chart. ROS:  Constitutional: No fever, chills or weight loss  Respiratory: No cough, SOB   CV: No chest pain or Palpitations  GI: has had constipation off and on for years, interested in trying linzess.     Visit Vitals    /74    Pulse 75    Temp 97.7 °F (36.5 °C) (Oral)    Resp 16    Ht 5' 8\" (1.727 m)    Wt 196 lb (88.9 kg)    SpO2 98%    BMI 29.8 kg/m2     Wt Readings from Last 3 Encounters:   07/12/17 196 lb (88.9 kg)   08/10/16 196 lb (88.9 kg)   07/25/16 196 lb (88.9 kg)     BP Readings from Last 3 Encounters:   07/12/17 126/74   08/10/16 119/53   07/25/16 133/74     Physical Examination: General appearance - alert, well appearing, and in no distress  Mental status - alert, oriented to person, place, and time  Eyes - pupils equal and reactive, extraocular eye movements intact  ENT - bilateral external ears and nose normal. Normal lips  Neck - supple, no significant adenopathy, no thyromegaly or mass  Lymphatics - no palpable lymphadenopathy, no hepatosplenomegaly  Chest - clear to auscultation, no wheezes, rales or rhonchi, symmetric air entry  Heart - normal rate, regular rhythm, normal S1, S2, no murmurs, rubs, clicks or gallops  Extremities - peripheral pulses normal, no pedal edema, no clubbing or cyanosis    Results for orders placed or performed in visit on 07/12/17   AMB POC URINALYSIS DIP STICK AUTO W/O MICRO   Result Value Ref Range    Color (UA POC) Dark Yellow     Clarity (UA POC) Clear     Glucose (UA POC) Negative Negative    Bilirubin (UA POC) 1+ Negative    Ketones (UA POC) Trace Negative    Specific gravity (UA POC) 1.015 1.001 - 1.035    Blood (UA POC) Negative Negative    pH (UA POC) 7.0 4.6 - 8.0    Protein (UA POC) Trace Negative mg/dL    Urobilinogen (UA POC) 2 mg/dL 0.2 - 1    Nitrites (UA POC) Negative Negative Leukocyte esterase (UA POC) Trace Negative     A/P:  HTN  well controlled. con't current tx. Check labs    HLD  well controlled. con't current tx. Check labs    GERD  well controlled. con't current tx. OAB sx   Pt rec to get GYN exam at her earliest convenience to look for caruncle and cystocele.     F/U 6mo, can order refills on request. Yes

## 2022-09-20 ENCOUNTER — OFFICE VISIT (OUTPATIENT)
Dept: FAMILY MEDICINE CLINIC | Age: 77
End: 2022-09-20

## 2022-09-20 VITALS
HEART RATE: 43 BPM | OXYGEN SATURATION: 100 % | DIASTOLIC BLOOD PRESSURE: 88 MMHG | BODY MASS INDEX: 20.52 KG/M2 | SYSTOLIC BLOOD PRESSURE: 142 MMHG | RESPIRATION RATE: 22 BRPM | TEMPERATURE: 97.1 F | HEIGHT: 68 IN | WEIGHT: 135.4 LBS

## 2022-09-20 DIAGNOSIS — M75.101 ROTATOR CUFF TEAR ARTHROPATHY OF RIGHT SHOULDER: ICD-10-CM

## 2022-09-20 DIAGNOSIS — N18.30 STAGE 3 CHRONIC KIDNEY DISEASE, UNSPECIFIED WHETHER STAGE 3A OR 3B CKD (HCC): ICD-10-CM

## 2022-09-20 DIAGNOSIS — Z13.31 SCREENING FOR DEPRESSION: ICD-10-CM

## 2022-09-20 DIAGNOSIS — Z13.39 SCREENING FOR ALCOHOLISM: ICD-10-CM

## 2022-09-20 DIAGNOSIS — M12.811 ROTATOR CUFF TEAR ARTHROPATHY OF RIGHT SHOULDER: ICD-10-CM

## 2022-09-20 DIAGNOSIS — D12.6 ADENOMATOUS POLYP OF COLON, UNSPECIFIED PART OF COLON: ICD-10-CM

## 2022-09-20 DIAGNOSIS — I10 ESSENTIAL HYPERTENSION: ICD-10-CM

## 2022-09-20 DIAGNOSIS — E78.00 HIGH CHOLESTEROL: ICD-10-CM

## 2022-09-20 DIAGNOSIS — Z00.00 MEDICARE ANNUAL WELLNESS VISIT, SUBSEQUENT: ICD-10-CM

## 2022-09-20 DIAGNOSIS — R93.3 ABNORMAL CT SCAN, COLON: ICD-10-CM

## 2022-09-20 DIAGNOSIS — M79.7 FIBROMYALGIA: ICD-10-CM

## 2022-09-20 DIAGNOSIS — I10 ESSENTIAL HYPERTENSION, BENIGN: ICD-10-CM

## 2022-09-20 DIAGNOSIS — R11.0 NAUSEA: ICD-10-CM

## 2022-09-20 DIAGNOSIS — M47.816 LUMBAR SPONDYLOSIS: ICD-10-CM

## 2022-09-20 DIAGNOSIS — R63.4 WEIGHT LOSS, NON-INTENTIONAL: ICD-10-CM

## 2022-09-20 DIAGNOSIS — Z23 ENCOUNTER FOR IMMUNIZATION: ICD-10-CM

## 2022-09-20 DIAGNOSIS — F43.21 ADJUSTMENT DISORDER WITH DEPRESSED MOOD: ICD-10-CM

## 2022-09-20 DIAGNOSIS — M17.12 PRIMARY OSTEOARTHRITIS OF LEFT KNEE: ICD-10-CM

## 2022-09-20 DIAGNOSIS — I10 PRIMARY HYPERTENSION: Primary | ICD-10-CM

## 2022-09-20 DIAGNOSIS — Z51.81 THERAPEUTIC DRUG MONITORING: ICD-10-CM

## 2022-09-20 DIAGNOSIS — N32.81 OAB (OVERACTIVE BLADDER): ICD-10-CM

## 2022-09-20 PROCEDURE — 90694 VACC AIIV4 NO PRSRV 0.5ML IM: CPT | Performed by: FAMILY MEDICINE

## 2022-09-20 PROCEDURE — G0439 PPPS, SUBSEQ VISIT: HCPCS | Performed by: FAMILY MEDICINE

## 2022-09-20 PROCEDURE — G0008 ADMIN INFLUENZA VIRUS VAC: HCPCS | Performed by: FAMILY MEDICINE

## 2022-09-20 PROCEDURE — 99214 OFFICE O/P EST MOD 30 MIN: CPT | Performed by: FAMILY MEDICINE

## 2022-09-20 RX ORDER — FLUOXETINE HYDROCHLORIDE 40 MG/1
40 CAPSULE ORAL DAILY
Qty: 90 CAPSULE | Refills: 3 | Status: SHIPPED | OUTPATIENT
Start: 2022-09-20

## 2022-09-20 RX ORDER — OXYBUTYNIN CHLORIDE 5 MG/1
TABLET ORAL
Qty: 180 TABLET | Refills: 3 | Status: SHIPPED | OUTPATIENT
Start: 2022-09-20

## 2022-09-20 RX ORDER — LOSARTAN POTASSIUM 50 MG/1
TABLET ORAL
Qty: 90 TABLET | Refills: 3 | Status: SHIPPED | OUTPATIENT
Start: 2022-09-20

## 2022-09-20 RX ORDER — ONDANSETRON 4 MG/1
4 TABLET, FILM COATED ORAL
Qty: 21 TABLET | Refills: 0 | Status: SHIPPED | OUTPATIENT
Start: 2022-09-20

## 2022-09-20 RX ORDER — MIRTAZAPINE 7.5 MG/1
7.5 TABLET, FILM COATED ORAL
Qty: 30 TABLET | Refills: 11 | Status: SHIPPED | OUTPATIENT
Start: 2022-09-20

## 2022-09-20 RX ORDER — ATORVASTATIN CALCIUM 40 MG/1
TABLET, FILM COATED ORAL
Qty: 90 TABLET | Refills: 3 | Status: SHIPPED | OUTPATIENT
Start: 2022-09-20

## 2022-09-20 RX ORDER — CHLORTHALIDONE 25 MG/1
TABLET ORAL
Qty: 90 TABLET | Refills: 3 | Status: SHIPPED | OUTPATIENT
Start: 2022-09-20

## 2022-09-20 RX ORDER — TRAMADOL HYDROCHLORIDE 50 MG/1
50 TABLET ORAL
Qty: 100 TABLET | Refills: 2 | Status: SHIPPED | OUTPATIENT
Start: 2022-09-20 | End: 2022-09-27 | Stop reason: SDUPTHER

## 2022-09-20 NOTE — PROGRESS NOTES
Haile Handley is a 68 y.o. female presenting for/with:    Chief Complaint   Patient presents with    Annual Wellness Visit    Hypertension    Chronic Kidney Disease       Visit Vitals  BP (!) 142/88   Pulse (!) 43   Temp 97.1 °F (36.2 °C) (Temporal)   Resp 22   Ht 5' 8\" (1.727 m)   Wt 135 lb 6.4 oz (61.4 kg)   SpO2 100%   BMI 20.59 kg/m²     Pain Scale: /10  Pain Location:     1. \"Have you been to the ER, urgent care clinic since your last visit? Hospitalized since your last visit? \" No    2. \"Have you seen or consulted any other health care providers outside of the [address] [place] [address] since your last visit? \" No     3. For patients aged 39-70: Has the patient had a colonoscopy / FIT/ Cologuard? NA - based on age      If the patient is female:    4. For patients aged 41-77: Has the patient had a mammogram within the past 2 years? NA - based on age or sex      11. For patients aged 21-65: Has the patient had a pap smear? NA - based on age or sex      Symptom review:  Learning Assessment 2/18/2022   PRIMARY LEARNER Patient   PRIMARY LANGUAGE ENGLISH   LEARNER PREFERENCE PRIMARY DEMONSTRATION   ANSWERED BY patient   RELATIONSHIP SELF     Fall Risk Assessment, last 12 mths 9/20/2022   Able to walk? Yes   Fall in past 12 months? 1   Do you feel unsteady? 1   Are you worried about falling 1   Is TUG test greater than 12 seconds? -   Is the gait abnormal? 1   Number of falls in past 12 months 2   Fall with injury?  0       3 most recent PHQ Screens 9/20/2022   PHQ Not Done -   Little interest or pleasure in doing things Several days   Feeling down, depressed, irritable, or hopeless Several days   Total Score PHQ 2 2   Trouble falling or staying asleep, or sleeping too much -   Feeling tired or having little energy -   Poor appetite, weight loss, or overeating -   Feeling bad about yourself - or that you are a failure or have let yourself or your family down -   Trouble concentrating on things such as school, work, reading, or watching TV -   Moving or speaking so slowly that other people could have noticed; or the opposite being so fidgety that others notice -   Thoughts of being better off dead, or hurting yourself in some way -   PHQ 9 Score -     Abuse Screening Questionnaire 9/20/2022   Do you ever feel afraid of your partner? N   Are you in a relationship with someone who physically or mentally threatens you? N   Is it safe for you to go home? Y       ADL Assessment 9/20/2022   Feeding yourself No Help Needed   Getting from bed to chair No Help Needed   Getting dressed No Help Needed   Bathing or showering No Help Needed   Walk across the room (includes cane/walker) No Help Needed   Using the telphone No Help Needed   Taking your medications No Help Needed   Preparing meals No Help Needed   Managing money (expenses/bills) No Help Needed   Moderately strenuous housework (laundry) No Help Needed   Shopping for personal items (toiletries/medicines) No Help Needed   Shopping for groceries No Help Needed   Driving No Help Needed   Climbing a flight of stairs No Help Needed   Getting to places beyond walking distances No Help Needed      Advance Care Planning 9/20/2022   Patient's Healthcare Decision Maker is: Named in scanned ACP document   Primary Decision Maker Name -   Secondary Decision Maker Name -   Secondary Decision Maker Relationship to Patient -   Confirm Advance Directive Yes, on file   Patient Would Like to Complete Advance Directive -        This is the Subsequent Medicare Annual Wellness Exam, performed 12 months or more after the Initial AWV or the last Subsequent AWV    I have reviewed the patient's medical history in detail and updated the computerized patient record. Assessment/Plan   Education and counseling provided:  Are appropriate based on today's review and evaluation    1. Primary hypertension  -     METABOLIC PANEL, BASIC; Future  2. Medicare annual wellness visit, subsequent  3.  Encounter for immunization  -     INFLUENZA, FLUAD, (AGE 65 Y+), IM, PF, 0.5 ML  4. Nausea  -     CT ABD PELV W CONT; Future  -     ondansetron hcl (ZOFRAN) 4 mg tablet; Take 1 Tablet by mouth three (3) times daily as needed for Nausea or Vomiting., Normal, Disp-21 Tablet, R-0  5. Weight loss, non-intentional  -     CT ABD PELV W CONT; Future  -     ondansetron hcl (ZOFRAN) 4 mg tablet; Take 1 Tablet by mouth three (3) times daily as needed for Nausea or Vomiting., Normal, Disp-21 Tablet, R-0  -     mirtazapine (REMERON) 7.5 mg tablet; Take 1 Tablet by mouth nightly. Indications: sleep, Normal, Disp-30 Tablet, R-11  6. Adenomatous polyp of colon, unspecified part of colon  -     CT ABD PELV W CONT; Future  7. Abnormal CT scan, colon  -     CT ABD PELV W CONT; Future  8. Fibromyalgia  -     FLUoxetine (PROzac) 40 mg capsule; Take 1 Capsule by mouth daily. Indications: nerves, Normal, Disp-90 Capsule, R-3  -     traMADoL (ULTRAM) 50 mg tablet; Take 1 Tablet by mouth two (2) times daily as needed for Pain for up to 90 days. Max Daily Amount: 100 mg. 2 tabs 3 times per day as needed for pain  Indications: pain, fibromyalgia, Normal, Disp-100 Tablet, R-2WG/RA [place]  -     mirtazapine (REMERON) 7.5 mg tablet; Take 1 Tablet by mouth nightly. Indications: sleep, Normal, Disp-30 Tablet, R-11  9. Adjustment disorder with depressed mood  -     FLUoxetine (PROzac) 40 mg capsule; Take 1 Capsule by mouth daily. Indications: nerves, Normal, Disp-90 Capsule, R-3  10. Essential hypertension, benign  -     losartan (COZAAR) 50 mg tablet; TAKE 1 TABLET BY MOUTH ONCE DAILY FOR BLOOD PRESSURE, Normal, Disp-90 Tablet, R-3  11. Stage 3 chronic kidney disease, unspecified whether stage 3a or 3b CKD (HCC)  -     losartan (COZAAR) 50 mg tablet; TAKE 1 TABLET BY MOUTH ONCE DAILY FOR BLOOD PRESSURE, Normal, Disp-90 Tablet, R-3  12. OAB (overactive bladder)  -     oxybutynin (DITROPAN) 5 mg tablet; 1 daily for bladder, Normal, Disp-180 Tablet, R-3  13. Essential hypertension  -     chlorthalidone (HYGROTON) 25 mg tablet; TAKE 1 TABLET BY MOUTH ONCE DAILY for pressure, Normal, Disp-90 Tablet, R-3  14. High cholesterol  -     atorvastatin (LIPITOR) 40 mg tablet; TAKE 1 TABLET BY MOUTH EVERY DAY FOR HEART AND CHOLESTEROL, Normal, Disp-90 Tablet, R-3  15. Primary osteoarthritis of left knee  -     traMADoL (ULTRAM) 50 mg tablet; Take 1 Tablet by mouth two (2) times daily as needed for Pain for up to 90 days. Max Daily Amount: 100 mg. 2 tabs 3 times per day as needed for pain  Indications: pain, fibromyalgia, Normal, Disp-100 Tablet, R-2WG/RA [place]  16. Rotator cuff tear arthropathy of right shoulder  -     traMADoL (ULTRAM) 50 mg tablet; Take 1 Tablet by mouth two (2) times daily as needed for Pain for up to 90 days. Max Daily Amount: 100 mg. 2 tabs 3 times per day as needed for pain  Indications: pain, fibromyalgia, Normal, Disp-100 Tablet, R-2WG/RA [place]  17. Lumbar spondylosis  -     traMADoL (ULTRAM) 50 mg tablet; Take 1 Tablet by mouth two (2) times daily as needed for Pain for up to 90 days. Max Daily Amount: 100 mg. 2 tabs 3 times per day as needed for pain  Indications: pain, fibromyalgia, Normal, Disp-100 Tablet, R-2WG/RA [place]  18. Therapeutic drug monitoring  -     7-DRUG SCREEN BUND., UR; Future  19. Screening for alcoholism  20.  Screening for depression  -     DEPRESSION SCREEN ANNUAL       Depression Risk Factor Screening     3 most recent PHQ Screens 9/20/2022   PHQ Not Done -   Little interest or pleasure in doing things Several days   Feeling down, depressed, irritable, or hopeless Several days   Total Score PHQ 2 2   Trouble falling or staying asleep, or sleeping too much -   Feeling tired or having little energy -   Poor appetite, weight loss, or overeating -   Feeling bad about yourself - or that you are a failure or have let yourself or your family down -   Trouble concentrating on things such as school, work, reading, or watching TV - Moving or speaking so slowly that other people could have noticed; or the opposite being so fidgety that others notice -   Thoughts of being better off dead, or hurting yourself in some way -   PHQ 9 Score -       Alcohol & Drug Abuse Risk Screen    Do you average more than 1 drink per night or more than 7 drinks a week:  No    On any one occasion in the past three months have you have had more than 3 drinks containing alcohol:  No          Functional Ability and Level of Safety    Hearing: Hearing is good. Activities of Daily Living: The home contains: no safety equipment. Patient does total self care      Ambulation: with no difficulty     Fall Risk:  Fall Risk Assessment, last 12 mths 9/20/2022   Able to walk? Yes   Fall in past 12 months? 1   Do you feel unsteady? 1   Are you worried about falling 1   Is TUG test greater than 12 seconds? -   Is the gait abnormal? 1   Number of falls in past 12 months 2   Fall with injury?  0      Abuse Screen:  Patient is not abused       Cognitive Screening    Has your family/caregiver stated any concerns about your memory: no         Health Maintenance Due     Health Maintenance Due   Topic Date Due    COVID-19 Vaccine (4 - Booster for Moderna series) 02/01/2022       Patient Care Team   Patient Care Team:  Alana Bledsoe MD as PCP - General (Family Medicine)  Alana Bledsoe MD as PCP [facility] Empaneled Provider    History     Patient Active Problem List   Diagnosis Code    Lumbar spondylosis M47.816    Back pain, chronic M54.9, G89.29    Rotator cuff tear arthropathy of right shoulder M75.101, M12.811    Cervical spondylosis M47.812    Fibromyalgia M79.7    Insomnia G47.00    High cholesterol E78.00    Gastroesophageal reflux disease K21.9    Primary osteoarthritis of left hip M16.12    Benign joint hypermobility M35.7    Primary localized osteoarthrosis, left knee M17.10    Status post left hip replacement Z96.642    Sleep apnea G47.30    Allergic rhinitis J30.9    Irritable bowel syndrome without diarrhea K58.9    Hypertensive kidney disease with stage 3a chronic kidney disease (HCC) I12.9, N18.31    Closed fracture of neck of left femur ([address] S72.002A    Hypertension I10    Chronic renal disease, stage III N18.30     Past Medical History:   Diagnosis Date    Arthritis     generalized     Chronic pain     fibromalgia    Fibromyalgia     GERD (gastroesophageal reflux disease)     Hypercholesterolemia     Hypertension     Unspecified adverse effect of anesthesia     needing more for epidural anesthesia    Unspecified sleep apnea     not using a cpap      Past Surgical History:   Procedure Laterality Date    HX ANKLE FRACTURE TX Right 1998    plate and screws    HX GYN      ovarian cyst removal    HX HYSTERECTOMY  1983    partial     HX ORTHOPAEDIC  5/4/15    RIGHT TOTAL HIP ARTHROPLASTY WITH NAVIGATION     HX OVARIAN CYST REMOVAL  2013    HX TONSILLECTOMY       Current Outpatient Medications   Medication Sig Dispense Refill    ondansetron hcl (ZOFRAN) 4 mg tablet Take 1 Tablet by mouth three (3) times daily as needed for Nausea or Vomiting. 21 Tablet 0    FLUoxetine (PROzac) 40 mg capsule Take 1 Capsule by mouth daily. Indications: nerves 90 Capsule 3    losartan (COZAAR) 50 mg tablet TAKE 1 TABLET BY MOUTH ONCE DAILY FOR BLOOD PRESSURE 90 Tablet 3    oxybutynin (DITROPAN) 5 mg tablet 1 daily for bladder 180 Tablet 3    chlorthalidone (HYGROTON) 25 mg tablet TAKE 1 TABLET BY MOUTH ONCE DAILY for pressure 90 Tablet 3    atorvastatin (LIPITOR) 40 mg tablet TAKE 1 TABLET BY MOUTH EVERY DAY FOR HEART AND CHOLESTEROL 90 Tablet 3    traMADoL (ULTRAM) 50 mg tablet Take 1 Tablet by mouth two (2) times daily as needed for Pain for up to 90 days. Max Daily Amount: 100 mg. 2 tabs 3 times per day as needed for pain  Indications: pain, fibromyalgia 100 Tablet 2    mirtazapine (REMERON) 7.5 mg tablet Take 1 Tablet by mouth nightly.  Indications: sleep 30 Tablet 11    metoprolol succinate (TOPROL-XL) 50 mg XL tablet TAKE 1 AND 1/2 TABLETS BY MOUTH EVERY DAY FOR BLOOD PRESSURE 135 Tablet 3    meloxicam (MOBIC) 15 mg tablet TAKE 1 TABLET BY MOUTH EVERY DAY WITH FOOD AS NEEDED FOR PAIN 90 Tablet 3    amLODIPine (NORVASC) 10 mg tablet TAKE 1 TABLET BY MOUTH EVERY DAY FOR PRESSURE 90 Tablet 3    tiZANidine (ZANAFLEX) 4 mg tablet TAKE 1 TABLET BY MOUTH THREE TIMES DAILY AS NEEDED FOR SPASM 90 Tablet 1    potassium chloride (K-DUR, KLOR-CON M20) 20 mEq tablet Take 1 Tablet by mouth daily. 90 Tablet 3    omeprazole (PRILOSEC) 40 mg capsule Take 1 Capsule by mouth daily. 90 Capsule 3    omega 3-dha-epa-fish oil (Fish OiL) 100-160-1,000 mg cap Take 1,000 mg by mouth daily. cetirizine (ZYRTEC) 5 mg tablet Take 5 mg by mouth daily. vitamin E (AQUA GEMS) 400 unit capsule Take 400 Units by mouth daily. aspirin 81 mg chewable tablet Take 81 mg by mouth daily. cholecalciferol, VITAMIN D3, (VITAMIN D3) 5,000 unit tab tablet Take  by mouth daily. flaxseed 1,000 mg cap Take 1,000 mg by mouth daily. morinda citrifolia fruit 250 mg cap Take 250 mg by mouth. MV,FE,MIN/LUTEIN (CENTRUM SILVER ULTRA WOMEN'S PO) Take  by mouth. CALCIUM CARBONATE (CALCIUM 600 PO) Take 600 mg by mouth daily. fish oil-dha-epa 1,200-144-216 mg cap Take  by mouth. ascorbic acid, vitamin C, (VITAMIN C) 1,000 mg tablet Take  by mouth.      polyethylene glycol (MIRALAX) 17 gram packet Take 17 g by mouth daily. cyanocobalamin 1,000 mcg tablet Take 1,000 mcg by mouth daily. Indications: PREVENTION OF VITAMIN B12 DEFICIENCY      dextran 70-hypromellose (ARTIFICIAL TEARS) ophthalmic solution Administer 2 Drops to both eyes as needed.  Indications: DRY EYE       No Known Allergies    Family History   Problem Relation Age of Onset    Hypertension Mother     Hypertension Father     Cancer Brother         colon    Cancer Brother         lung     Social History     Tobacco Use    Smoking status: Never    Smokeless tobacco: Never   Substance Use Topics    Alcohol use: No     Alcohol/week: 0.0 standard drinks         Panfilo Delaney

## 2022-09-20 NOTE — PROGRESS NOTES
Chief Complaint   Patient presents with    Annual Wellness Visit    Hypertension    Chronic Kidney Disease       Ivan Kinsey is a 68 y.o. female     Subjective:     Depression  Feeling better lately. Sleeping ok with ambien and prozac 40mg/d. Decreased appetite. Weigh down still lower since last visit. 3 most recent PHQ Screens 9/20/2022   PHQ Not Done -   Little interest or pleasure in doing things Several days   Feeling down, depressed, irritable, or hopeless Several days   Total Score PHQ 2 2   Trouble falling or staying asleep, or sleeping too much -   Feeling tired or having little energy -   Poor appetite, weight loss, or overeating -   Feeling bad about yourself - or that you are a failure or have let yourself or your family down -   Trouble concentrating on things such as school, work, reading, or watching TV -   Moving or speaking so slowly that other people could have noticed; or the opposite being so fidgety that others notice -   Thoughts of being better off dead, or hurting yourself in some way -   PHQ 9 Score -       Hypertension and CKD3. Blood pressures have been stable/in goal. Management at last visit included continuing current regimen . Current regimen: angiotensin II receptor antagonist, calcium channel blocker and thiazide diuretic. Symptoms include no symptoms. Patient denies chest pain, palpitations, peripheral edema, headache. Lab review:   Lab Results   Component Value Date/Time    Sodium 136 02/18/2022 01:24 PM    Potassium 4.0 02/18/2022 01:24 PM    Chloride 102 02/18/2022 01:24 PM    CO2 28 02/18/2022 01:24 PM    Anion gap 6 02/18/2022 01:24 PM    Glucose 98 02/18/2022 01:24 PM    BUN 21 (H) 02/18/2022 01:24 PM    Creatinine 1.00 02/18/2022 01:24 PM    BUN/Creatinine ratio 21 (H) 02/18/2022 01:24 PM    GFR est AA >60 02/18/2022 01:24 PM    GFR est non-AA 54 (L) 02/18/2022 01:24 PM    Calcium 10.1 02/18/2022 01:24 PM     Hyperlipidemia. On lipitor 40. Linda well.  No myalgias, arthralgias, unusual weakness. Lab Results   Component Value Date/Time    Cholesterol, total 183 02/18/2022 01:24 PM    HDL Cholesterol 89 02/18/2022 01:24 PM    LDL, calculated 76.4 02/18/2022 01:24 PM    VLDL, calculated 17.6 02/18/2022 01:24 PM    Triglyceride 88 02/18/2022 01:24 PM    CHOL/HDL Ratio 2.1 02/18/2022 01:24 PM     Lab Results   Component Value Date/Time    ALT (SGPT) 23 02/18/2022 01:24 PM    Alk. phosphatase 90 02/18/2022 01:24 PM    Bilirubin, total 0.5 02/18/2022 01:24 PM     GERD and hx colon polyps  On prilosec. Working well, but weight is down. Losing appetite, but eating some, and lately having some increased nausea without emesis. Did have some loose tool afterwards, but no blood. No hx pencil thin stools. We tried to get pt in with GI DR. Thornton, but he has moved to [facility], so she declined to see him there. Hasn't made alternative arrangements yet. Fibromyalgia  Remains on prozac, flexeril, gabapentin, mobic. Prev been Managed by Rheum Dr. Maura Burleson MD., rheumatologist at [facility], and Ortho at Dr[facility] Tami Carrillo [facility] in [address] [facility]. PMH, , Medications/Allergies: reviewed, on chart. Current Outpatient Medications   Medication Sig    metoprolol succinate (TOPROL-XL) 50 mg XL tablet TAKE 1 AND 1/2 TABLETS BY MOUTH EVERY DAY FOR BLOOD PRESSURE    meloxicam (MOBIC) 15 mg tablet TAKE 1 TABLET BY MOUTH EVERY DAY WITH FOOD AS NEEDED FOR PAIN    amLODIPine (NORVASC) 10 mg tablet TAKE 1 TABLET BY MOUTH EVERY DAY FOR PRESSURE    tiZANidine (ZANAFLEX) 4 mg tablet TAKE 1 TABLET BY MOUTH THREE TIMES DAILY AS NEEDED FOR SPASM    oxybutynin (DITROPAN) 5 mg tablet TAKE 1 TABLET BY MOUTH TWICE DAILY FOR BLADDER    atorvastatin (LIPITOR) 40 mg tablet TAKE 1 TABLET BY MOUTH EVERY DAY FOR HEART AND CHOLESTEROL    potassium chloride (K-DUR, KLOR-CON M20) 20 mEq tablet Take 1 Tablet by mouth daily. omeprazole (PRILOSEC) 40 mg capsule Take 1 Capsule by mouth daily.     zolpidem (AMBIEN) 10 mg tablet Take 1 Tablet by mouth nightly as needed for Sleep. Max Daily Amount: 10 mg. FLUoxetine (PROzac) 40 mg capsule Take 1 Capsule by mouth daily. losartan (COZAAR) 50 mg tablet TAKE 1 TABLET BY MOUTH ONCE DAILY FOR BLOOD PRESSURE    omega 3-dha-epa-fish oil (Fish OiL) 100-160-1,000 mg cap Take 1,000 mg by mouth daily. chlorthalidone (HYGROTON) 25 mg tablet TAKE 1 TABLET BY MOUTH ONCE DAILY for pressure    cetirizine (ZYRTEC) 5 mg tablet Take 5 mg by mouth daily. vitamin E (AQUA GEMS) 400 unit capsule Take 400 Units by mouth daily. aspirin 81 mg chewable tablet Take 81 mg by mouth daily. gabapentin (NEURONTIN) 300 mg capsule take 1 capsule by mouth three times a day  Indications: nerve pain    cholecalciferol, VITAMIN D3, (VITAMIN D3) 5,000 unit tab tablet Take  by mouth daily. flaxseed 1,000 mg cap Take 1,000 mg by mouth daily. morinda citrifolia fruit 250 mg cap Take 250 mg by mouth. MV,FE,MIN/LUTEIN (CENTRUM SILVER ULTRA WOMEN'S PO) Take  by mouth. CALCIUM CARBONATE (CALCIUM 600 PO) Take 600 mg by mouth daily. fish oil-dha-epa 1,200-144-216 mg cap Take  by mouth. ascorbic acid, vitamin C, (VITAMIN C) 1,000 mg tablet Take  by mouth.    polyethylene glycol (MIRALAX) 17 gram packet Take 17 g by mouth daily. cyanocobalamin 1,000 mcg tablet Take 1,000 mcg by mouth daily. Indications: PREVENTION OF VITAMIN B12 DEFICIENCY    dextran 70-hypromellose (ARTIFICIAL TEARS) ophthalmic solution Administer 2 Drops to both eyes as needed. Indications: DRY EYE     No current facility-administered medications for this visit. No Known Allergies    ROS:  Constitutional: No fever, chills.  POS weight loss  Respiratory: No cough, SOB   CV: No chest pain or Palpitations    VS review:  Visit Vitals  BP (!) 142/88   Pulse (!) 43   Temp 97.1 °F (36.2 °C) (Temporal)   Resp 22   Ht 5' 8\" (1.727 m)   Wt 135 lb 6.4 oz (61.4 kg)   SpO2 100%   BMI 20.59 kg/m²     -6#  Wt Readings from Last 3 Encounters:   09/20/22 135 lb 6.4 oz (61.4 kg)   05/20/22 141 lb 6.4 oz (64.1 kg)   02/18/22 144 lb 4 oz (65.4 kg)     BP Readings from Last 3 Encounters:   09/20/22 (!) 142/88   05/20/22 132/78   02/18/22 130/70       Objective:     General: alert, cooperative, no distress   Mental  status: mental status: alert, oriented to person, place, and time, normal mood, behavior, speech, dress, motor activity, and thought processes   Resp: resp: normal effort and no respiratory distress   Neuro: neuro: no gross deficits   EXT: No c/c/e. Assessment & Plan:     Adjustment disorder with depressed affect  In goal. Con't on prozac 40mg/d. Con't to work on nutrition. Hx colon polyp with worsening of wt loss. Concerned for neoplasm. Did have thickening of the descending colon attributed to inflammation on last CT, but possible neoplastic lesion. Never did get her colonoscopy due to covid and family emergencies. Check CT abd/pelvis. Will decide on rpt colonoscopy after CT report reviewed. RF zofran. Hx L Hip replacement, L shoulder pain, R shoulder pain, DJD C spine. We are now maintaining her pain meds. Con't to use the meloxicam 15mg/d. R/S tramadol, needed better control. D/C ambien, use remeron instead. Consider physical therapy, did well with [place] in past.    Anemia  Last CBC stable on last check since change diclofenac to mobic. Monitor. Fibromyalgia  well controlled. con't zanaflex, but use lower dose due to balance issues, 2mg/d. HTN/CKD3   BP good. Recheck labs today and adjust PRN    HLD  well controlled. con't current tx. Check Labs, adjust PRN. GERD  well controlled. con't current tx. OAB   Doing a little less well with the oxybutnin 5mg. Discussed options. F/U 3mo/ PRN Scan reports. 1. Have you been to the ER, urgent care clinic since your last visit? Hospitalized since your last visit? yes    2.  Have you seen or consulted any other health care providers outside of the [facility] [address] since your last visit? Include any pap smears or colon screening.  no

## 2022-09-20 NOTE — PATIENT INSTRUCTIONS
Medicare Wellness Visit    The best way to live healthy is to have a lifestyle where you eat a well-balanced diet, exercise regularly, limit alcohol use, and quit all forms of tobacco/nicotine, if applicable. Regular preventive services are another way to keep healthy. Preventive services (vaccines, screening tests, monitoring & exams) can help personalize your care plan, which helps you manage your own care. Screening tests can find health problems at the earliest stages, when they are easiest to treat. [facility] follows the current, evidence-based guidelines published by the [facility] Francesco Campbell ([facility]) when recommending preventive services for our patients. Because we follow these guidelines, sometimes recommendations change over time as research supports it. (For example, a prostate screening blood test is no longer routinely recommended for men with no symptoms.)  Of course, you and your doctor may decide to screen more often for some diseases, based on your risk and co-morbidities (chronic disease you are already diagnosed with). Preventive services for you include:  - Medicare offers their members a free annual wellness visit, which is time for you and your primary care provider to discuss and plan for your preventive service needs. Take advantage of this benefit every year! Here is a list of your current Health Maintenance items (your personalized list of preventive services) with a due date:    Health Maintenance Due   Topic Date Due    COVID-19 Vaccine (4 - Booster for Moderna series) 02/01/2022   Get the new covid omicron booster (bivalent) when you can.

## 2022-09-22 LAB
AMPHETAMINES UR QL SCN: NEGATIVE NG/ML
ANION GAP SERPL CALC-SCNC: 5 MMOL/L (ref 5–15)
BARBITURATES UR QL SCN: NEGATIVE NG/ML
BENZODIAZ UR QL: NEGATIVE NG/ML
BUN SERPL-MCNC: 26 MG/DL (ref 6–20)
BUN/CREAT SERPL: 21 (ref 12–20)
BZE UR QL: NEGATIVE NG/ML
CALCIUM SERPL-MCNC: 10 MG/DL (ref 8.5–10.1)
CANNABINOIDS UR QL SCN: NEGATIVE NG/ML
CHLORIDE SERPL-SCNC: 102 MMOL/L (ref 97–108)
CO2 SERPL-SCNC: 31 MMOL/L (ref 21–32)
CREAT SERPL-MCNC: 1.22 MG/DL (ref 0.55–1.02)
GLUCOSE SERPL-MCNC: 97 MG/DL (ref 65–100)
OPIATES UR QL: NEGATIVE NG/ML
PCP UR QL: NEGATIVE NG/ML
POTASSIUM SERPL-SCNC: 4 MMOL/L (ref 3.5–5.1)
SODIUM SERPL-SCNC: 138 MMOL/L (ref 136–145)

## 2022-09-26 ENCOUNTER — TELEPHONE (OUTPATIENT)
Dept: FAMILY MEDICINE CLINIC | Age: 77
End: 2022-09-26

## 2022-09-26 DIAGNOSIS — M12.811 ROTATOR CUFF TEAR ARTHROPATHY OF RIGHT SHOULDER: ICD-10-CM

## 2022-09-26 DIAGNOSIS — M75.101 ROTATOR CUFF TEAR ARTHROPATHY OF RIGHT SHOULDER: ICD-10-CM

## 2022-09-26 DIAGNOSIS — M17.12 PRIMARY OSTEOARTHRITIS OF LEFT KNEE: ICD-10-CM

## 2022-09-26 DIAGNOSIS — M79.7 FIBROMYALGIA: ICD-10-CM

## 2022-09-26 DIAGNOSIS — M47.816 LUMBAR SPONDYLOSIS: ICD-10-CM

## 2022-09-26 NOTE — TELEPHONE ENCOUNTER
Received notice from Ailyn Rodrigez in [facility] stating \"Plan requires specific directions to process the prescription. Please fax back with frequency of how patient will be using the medication and days supply limitations. Bid or tid? \"

## 2022-09-27 RX ORDER — TRAMADOL HYDROCHLORIDE 50 MG/1
50 TABLET ORAL
Qty: 60 TABLET | Refills: 2 | Status: SHIPPED | OUTPATIENT
Start: 2022-09-27 | End: 2022-12-26

## 2022-09-28 ENCOUNTER — HOSPITAL ENCOUNTER (OUTPATIENT)
Dept: CT IMAGING | Age: 77
Discharge: HOME | End: 2022-09-28
Attending: FAMILY MEDICINE
Payer: MEDICARE

## 2022-09-28 DIAGNOSIS — R93.3 ABNORMAL CT SCAN, COLON: ICD-10-CM

## 2022-09-28 DIAGNOSIS — R11.0 NAUSEA: ICD-10-CM

## 2022-09-28 DIAGNOSIS — R63.4 WEIGHT LOSS, NON-INTENTIONAL: ICD-10-CM

## 2022-09-28 DIAGNOSIS — D12.6 ADENOMATOUS POLYP OF COLON, UNSPECIFIED PART OF COLON: ICD-10-CM

## 2022-09-28 PROCEDURE — 74177 CT ABD & PELVIS W/CONTRAST: CPT

## 2022-09-28 PROCEDURE — 74011000250 HC RX REV CODE- 250: Performed by: FAMILY MEDICINE

## 2022-09-28 PROCEDURE — 74011000636 HC RX REV CODE- 636: Performed by: FAMILY MEDICINE

## 2022-09-28 RX ORDER — BARIUM SULFATE 20 MG/ML
450 SUSPENSION ORAL
Status: COMPLETED | OUTPATIENT
Start: 2022-09-28 | End: 2022-09-28

## 2022-09-28 RX ADMIN — BARIUM SULFATE 450 ML: 20 SUSPENSION ORAL at 09:10

## 2022-09-28 RX ADMIN — IOPAMIDOL 100 ML: 612 INJECTION, SOLUTION INTRAVENOUS at 10:17

## 2022-10-06 ENCOUNTER — HOSPITAL ENCOUNTER (OUTPATIENT)
Dept: MAMMOGRAPHY | Age: 77
Discharge: HOME | End: 2022-10-06
Attending: FAMILY MEDICINE
Payer: MEDICARE

## 2022-10-06 DIAGNOSIS — Z12.31 BREAST CANCER SCREENING BY MAMMOGRAM: ICD-10-CM

## 2022-10-06 PROCEDURE — 77063 BREAST TOMOSYNTHESIS BI: CPT

## 2022-12-06 ENCOUNTER — TELEPHONE (OUTPATIENT)
Dept: FAMILY MEDICINE CLINIC | Age: 77
End: 2022-12-06

## 2022-12-07 NOTE — TELEPHONE ENCOUNTER
[address] [facility]    Per result letter from PCP. CT shows no colon mass, but you have a lot of stool that needs to be cleaned out (constipation). This can cause decreased appetite and weight loss. I recommend miralax 1 scoop twice daily until stools come soft and easy, and 1 scoop/day afterwards as needed to keep stool moving easily. Some irritation seen to kidney, will recheck that in a few months.

## 2023-03-13 ENCOUNTER — OFFICE VISIT (OUTPATIENT)
Dept: FAMILY MEDICINE CLINIC | Age: 78
End: 2023-03-13
Payer: MEDICARE

## 2023-03-13 VITALS
RESPIRATION RATE: 18 BRPM | HEIGHT: 68 IN | OXYGEN SATURATION: 99 % | BODY MASS INDEX: 23.22 KG/M2 | SYSTOLIC BLOOD PRESSURE: 128 MMHG | DIASTOLIC BLOOD PRESSURE: 66 MMHG | HEART RATE: 61 BPM | WEIGHT: 153.2 LBS | TEMPERATURE: 97.1 F

## 2023-03-13 DIAGNOSIS — M79.7 FIBROMYALGIA: ICD-10-CM

## 2023-03-13 DIAGNOSIS — I12.9 HYPERTENSIVE KIDNEY DISEASE WITH STAGE 3A CHRONIC KIDNEY DISEASE (HCC): ICD-10-CM

## 2023-03-13 DIAGNOSIS — R63.4 WEIGHT LOSS, NON-INTENTIONAL: ICD-10-CM

## 2023-03-13 DIAGNOSIS — N18.30 STAGE 3 CHRONIC KIDNEY DISEASE, UNSPECIFIED WHETHER STAGE 3A OR 3B CKD (HCC): ICD-10-CM

## 2023-03-13 DIAGNOSIS — E78.00 HIGH CHOLESTEROL: ICD-10-CM

## 2023-03-13 DIAGNOSIS — I10 PRIMARY HYPERTENSION: Primary | ICD-10-CM

## 2023-03-13 DIAGNOSIS — N18.31 HYPERTENSIVE KIDNEY DISEASE WITH STAGE 3A CHRONIC KIDNEY DISEASE (HCC): ICD-10-CM

## 2023-03-13 PROCEDURE — 1090F PRES/ABSN URINE INCON ASSESS: CPT | Performed by: FAMILY MEDICINE

## 2023-03-13 PROCEDURE — G8399 PT W/DXA RESULTS DOCUMENT: HCPCS | Performed by: FAMILY MEDICINE

## 2023-03-13 PROCEDURE — G8536 NO DOC ELDER MAL SCRN: HCPCS | Performed by: FAMILY MEDICINE

## 2023-03-13 PROCEDURE — G8510 SCR DEP NEG, NO PLAN REQD: HCPCS | Performed by: FAMILY MEDICINE

## 2023-03-13 PROCEDURE — 1123F ACP DISCUSS/DSCN MKR DOCD: CPT | Performed by: FAMILY MEDICINE

## 2023-03-13 PROCEDURE — G8427 DOCREV CUR MEDS BY ELIG CLIN: HCPCS | Performed by: FAMILY MEDICINE

## 2023-03-13 PROCEDURE — 3074F SYST BP LT 130 MM HG: CPT | Performed by: FAMILY MEDICINE

## 2023-03-13 PROCEDURE — 1101F PT FALLS ASSESS-DOCD LE1/YR: CPT | Performed by: FAMILY MEDICINE

## 2023-03-13 PROCEDURE — 3078F DIAST BP <80 MM HG: CPT | Performed by: FAMILY MEDICINE

## 2023-03-13 PROCEDURE — 99214 OFFICE O/P EST MOD 30 MIN: CPT | Performed by: FAMILY MEDICINE

## 2023-03-13 PROCEDURE — G8420 CALC BMI NORM PARAMETERS: HCPCS | Performed by: FAMILY MEDICINE

## 2023-03-13 RX ORDER — MIRTAZAPINE 7.5 MG/1
7.5 TABLET, FILM COATED ORAL
Qty: 90 TABLET | Refills: 3 | Status: SHIPPED | OUTPATIENT
Start: 2023-03-13

## 2023-03-13 NOTE — PROGRESS NOTES
Chief Complaint   Patient presents with    Hypertension     3 month follow up     Tuan Rod is a 68 y.o. female     Subjective:     Depression  Feeling better lately. Sleeping ok with ambien and prozac 40mg/d. Decreased appetite. Weigh down still lower since last visit. 3 most recent PHQ Screens 3/13/2023   PHQ Not Done -   Little interest or pleasure in doing things Several days   Feeling down, depressed, irritable, or hopeless Several days   Total Score PHQ 2 2   Trouble falling or staying asleep, or sleeping too much -   Feeling tired or having little energy -   Poor appetite, weight loss, or overeating -   Feeling bad about yourself - or that you are a failure or have let yourself or your family down -   Trouble concentrating on things such as school, work, reading, or watching TV -   Moving or speaking so slowly that other people could have noticed; or the opposite being so fidgety that others notice -   Thoughts of being better off dead, or hurting yourself in some way -   PHQ 9 Score -       Hypertension and CKD3. Blood pressures have been stable/in goal. Management at last visit included continuing current regimen . Current regimen: angiotensin II receptor antagonist, calcium channel blocker and thiazide diuretic. Symptoms include no symptoms. Patient denies chest pain, palpitations, peripheral edema, headache. Lab review:   Lab Results   Component Value Date/Time    Sodium 138 09/20/2022 09:04 AM    Potassium 4.0 09/20/2022 09:04 AM    Chloride 102 09/20/2022 09:04 AM    CO2 31 09/20/2022 09:04 AM    Anion gap 5 09/20/2022 09:04 AM    Glucose 97 09/20/2022 09:04 AM    BUN 26 (H) 09/20/2022 09:04 AM    Creatinine 1.22 (H) 09/20/2022 09:04 AM    BUN/Creatinine ratio 21 (H) 09/20/2022 09:04 AM    GFR est AA 52 (L) 09/20/2022 09:04 AM    GFR est non-AA 43 (L) 09/20/2022 09:04 AM    Calcium 10.0 09/20/2022 09:04 AM     Hyperlipidemia. On lipitor 40. Linda well.  No myalgias, arthralgias, unusual weakness. Lab Results   Component Value Date/Time    Cholesterol, total 183 02/18/2022 01:24 PM    HDL Cholesterol 89 02/18/2022 01:24 PM    LDL, calculated 76.4 02/18/2022 01:24 PM    VLDL, calculated 17.6 02/18/2022 01:24 PM    Triglyceride 88 02/18/2022 01:24 PM    CHOL/HDL Ratio 2.1 02/18/2022 01:24 PM     Lab Results   Component Value Date/Time    ALT (SGPT) 23 02/18/2022 01:24 PM    Alk. phosphatase 90 02/18/2022 01:24 PM    Bilirubin, total 0.5 02/18/2022 01:24 PM     GERD and hx colon polyps wit gallstone (large) on CT fall 2022  On prilosec. Working well, appetite normal, wt back to normal now. No melena/ BRBPR. Last HGB ok    Fibromyalgia  Remains on prozac, flexeril, gabapentin, mobic. Prev been Managed by Rheum Dr. Sky Banuelos MD., rheumatologist at [facility], and Ortho at [facility] in [address] [place] [address]. PMH, SH, Medications/Allergies: reviewed, on chart. Current Outpatient Medications   Medication Sig    ondansetron hcl (ZOFRAN) 4 mg tablet Take 1 Tablet by mouth three (3) times daily as needed for Nausea or Vomiting. FLUoxetine (PROzac) 40 mg capsule Take 1 Capsule by mouth daily. Indications: nerves    losartan (COZAAR) 50 mg tablet TAKE 1 TABLET BY MOUTH ONCE DAILY FOR BLOOD PRESSURE    oxybutynin (DITROPAN) 5 mg tablet 1 daily for bladder    chlorthalidone (HYGROTON) 25 mg tablet TAKE 1 TABLET BY MOUTH ONCE DAILY for pressure    atorvastatin (LIPITOR) 40 mg tablet TAKE 1 TABLET BY MOUTH EVERY DAY FOR HEART AND CHOLESTEROL    mirtazapine (REMERON) 7.5 mg tablet Take 1 Tablet by mouth nightly.  Indications: sleep    metoprolol succinate (TOPROL-XL) 50 mg XL tablet TAKE 1 AND 1/2 TABLETS BY MOUTH EVERY DAY FOR BLOOD PRESSURE    meloxicam (MOBIC) 15 mg tablet TAKE 1 TABLET BY MOUTH EVERY DAY WITH FOOD AS NEEDED FOR PAIN    amLODIPine (NORVASC) 10 mg tablet TAKE 1 TABLET BY MOUTH EVERY DAY FOR PRESSURE    tiZANidine (ZANAFLEX) 4 mg tablet TAKE 1 TABLET BY MOUTH THREE TIMES DAILY AS NEEDED FOR SPASM potassium chloride (K-DUR, KLOR-CON M20) 20 mEq tablet Take 1 Tablet by mouth daily. omeprazole (PRILOSEC) 40 mg capsule Take 1 Capsule by mouth daily. omega 3-dha-epa-fish oil (Fish OiL) 100-160-1,000 mg cap Take 1,000 mg by mouth daily. cetirizine (ZYRTEC) 5 mg tablet Take 5 mg by mouth daily. vitamin E (AQUA GEMS) 400 unit capsule Take 400 Units by mouth daily. aspirin 81 mg chewable tablet Take 81 mg by mouth daily. cholecalciferol, VITAMIN D3, (VITAMIN D3) 5,000 unit tab tablet Take  by mouth daily. flaxseed 1,000 mg cap Take 1,000 mg by mouth daily. morinda citrifolia fruit 250 mg cap Take 250 mg by mouth. MV,FE,MIN/LUTEIN (CENTRUM SILVER ULTRA WOMEN'S PO) Take  by mouth. CALCIUM CARBONATE (CALCIUM 600 PO) Take 600 mg by mouth daily. fish oil-dha-epa 1,200-144-216 mg cap Take  by mouth. ascorbic acid, vitamin C, (VITAMIN C) 1,000 mg tablet Take  by mouth.    polyethylene glycol (MIRALAX) 17 gram packet Take 17 g by mouth daily. cyanocobalamin 1,000 mcg tablet Take 1,000 mcg by mouth daily. Indications: PREVENTION OF VITAMIN B12 DEFICIENCY    dextran 70-hypromellose (ARTIFICIAL TEARS) ophthalmic solution Administer 2 Drops to both eyes as needed. Indications: DRY EYE     No current facility-administered medications for this visit. No Known Allergies    ROS:  Constitutional: No fever, chills.  POS weight loss  Respiratory: No cough, SOB   CV: No chest pain or Palpitations    Objective:     VS review:  Visit Vitals  /66   Pulse 61   Temp 97.1 °F (36.2 °C) (Temporal)   Resp 18   Ht 5' 8\" (1.727 m)   Wt 153 lb 3.2 oz (69.5 kg)   SpO2 99%   BMI 23.29 kg/m²     +18#  Wt Readings from Last 3 Encounters:   03/13/23 153 lb 3.2 oz (69.5 kg)   09/20/22 135 lb 6.4 oz (61.4 kg)   05/20/22 141 lb 6.4 oz (64.1 kg)     BP Readings from Last 3 Encounters:   03/13/23 128/66   09/20/22 (!) 142/88   05/20/22 132/78     General: alert, cooperative, no distress   Mental  status: mental status: alert, oriented to person, place, and time, normal mood, behavior, speech, dress, motor activity, and thought processes   Resp: resp: normal effort and no respiratory distress   Neuro: neuro: no gross deficits   EXT: No c/c/e. Assessment & Plan:     Adjustment disorder with depressed affect  In goal. Con't on prozac 40mg/d. Con't to work on nutrition. Hx colon polyp with stabilized weight. Less concern for neoplasm. More recent CT abd/pelvis with large gallstone, otherwise no remarkable bowel findings, so can defer further colonoscopy at this time. Hx L Hip replacement, L shoulder pain, R shoulder pain, DJD C spine, fibromyalgia. We are now maintaining her pain meds. Con't to use the meloxicam 15mg/d, tramadol PRN. Consider R/S physical therapy, did well with [facility] in past. Saw Neuro in [facility], they ore CK, TSH, vitamin B12, folate and ESR, but pt never had that drawn. We can draw in future if pt wants that. Anemia  Last CBC ok on last check since on mobic daily. Monitor. Fibromyalgia  well controlled. con't zanaflex, but use lower dose due to balance issues, 2mg/d. HTN/CKD3   BP good. Recheck labs today and adjust PRN    HLD  well controlled. con't current tx. Check Labs, adjust PRN. GERD  well controlled. con't current tx. OAB   Doing a little less well with the oxybutnin 5mg. Discussed options. F/U 3mo/ PRN Scan reports.

## 2023-03-13 NOTE — PROGRESS NOTES
Sheila Mcrae is a 68 y.o. female presenting for/with:    Chief Complaint   Patient presents with    Hypertension     3 month follow up       Visit Vitals  /66   Pulse 61   Temp 97.1 °F (36.2 °C) (Temporal)   Resp 18   Ht 5' 8\" (1.727 m)   Wt 153 lb 3.2 oz (69.5 kg)   SpO2 99%   BMI 23.29 kg/m²     Pain Scale: /10  Pain Location:     1. \"Have you been to the ER, urgent care clinic since your last visit? Hospitalized since your last visit? \" Yes Where: [facility], [facility], [facility]    2. \"Have you seen or consulted any other health care providers outside of the [address] [place] [address] since your last visit? \" No     3. For patients aged 39-70: Has the patient had a colonoscopy / FIT/ Cologuard? Yes - no Care Gap present      If the patient is female:    4. For patients aged 41-77: Has the patient had a mammogram within the past 2 years? Yes - no Care Gap present      5. For patients aged 21-65: Has the patient had a pap smear? NA - based on age or sex      Symptom review:  Learning Assessment 2/18/2022   PRIMARY LEARNER Patient   PRIMARY LANGUAGE ENGLISH   LEARNER PREFERENCE PRIMARY DEMONSTRATION   ANSWERED BY patient   RELATIONSHIP SELF     Fall Risk Assessment, last 12 mths 3/13/2023   Able to walk? Yes   Fall in past 12 months? 1   Do you feel unsteady? 1   Are you worried about falling -   Is TUG test greater than 12 seconds? -   Is the gait abnormal? -   Number of falls in past 12 months 2   Fall with injury?  0       3 most recent PHQ Screens 3/13/2023   PHQ Not Done -   Little interest or pleasure in doing things Several days   Feeling down, depressed, irritable, or hopeless Several days   Total Score PHQ 2 2   Trouble falling or staying asleep, or sleeping too much -   Feeling tired or having little energy -   Poor appetite, weight loss, or overeating -   Feeling bad about yourself - or that you are a failure or have let yourself or your family down -   Trouble concentrating on things such as school, work, reading, or watching TV -   Moving or speaking so slowly that other people could have noticed; or the opposite being so fidgety that others notice -   Thoughts of being better off dead, or hurting yourself in some way -   PHQ 9 Score -     Abuse Screening Questionnaire 3/13/2023   Do you ever feel afraid of your partner? N   Are you in a relationship with someone who physically or mentally threatens you? N   Is it safe for you to go home?  Y       ADL Assessment 3/13/2023   Feeding yourself No Help Needed   Getting from bed to chair No Help Needed   Getting dressed No Help Needed   Bathing or showering No Help Needed   Walk across the room (includes cane/walker) No Help Needed   Using the telphone No Help Needed   Taking your medications No Help Needed   Preparing meals No Help Needed   Managing money (expenses/bills) No Help Needed   Moderately strenuous housework (laundry) No Help Needed   Shopping for personal items (toiletries/medicines) No Help Needed   Shopping for groceries No Help Needed   Driving No Help Needed   Climbing a flight of stairs No Help Needed   Getting to places beyond walking distances No Help Needed      Advance Care Planning 3/13/2023   Patient's Healthcare Decision Maker is: Named in scanned ACP document   Primary Decision Maker Name -   Secondary Decision Maker Name -   Secondary Decision Maker Relationship to Patient -   Confirm Advance Directive Yes, on file   Patient Would Like to Complete Advance Directive -

## 2023-03-14 LAB
ALBUMIN SERPL-MCNC: 3.9 G/DL (ref 3.5–5)
ALBUMIN/GLOB SERPL: 1.3 (ref 1.1–2.2)
ALP SERPL-CCNC: 76 U/L (ref 45–117)
ALT SERPL-CCNC: 20 U/L (ref 12–78)
ANION GAP SERPL CALC-SCNC: 5 MMOL/L (ref 5–15)
AST SERPL-CCNC: 18 U/L (ref 15–37)
BILIRUB SERPL-MCNC: 0.3 MG/DL (ref 0.2–1)
BUN SERPL-MCNC: 19 MG/DL (ref 6–20)
BUN/CREAT SERPL: 16 (ref 12–20)
CALCIUM SERPL-MCNC: 9.7 MG/DL (ref 8.5–10.1)
CHLORIDE SERPL-SCNC: 103 MMOL/L (ref 97–108)
CHOLEST SERPL-MCNC: 174 MG/DL
CO2 SERPL-SCNC: 32 MMOL/L (ref 21–32)
CREAT SERPL-MCNC: 1.17 MG/DL (ref 0.55–1.02)
CREAT UR-MCNC: 54.9 MG/DL
GLOBULIN SER CALC-MCNC: 3 G/DL (ref 2–4)
GLUCOSE SERPL-MCNC: 89 MG/DL (ref 65–100)
HDLC SERPL-MCNC: 84 MG/DL
HDLC SERPL: 2.1 (ref 0–5)
LDLC SERPL CALC-MCNC: 75.2 MG/DL (ref 0–100)
MICROALBUMIN UR-MCNC: 1.65 MG/DL
MICROALBUMIN/CREAT UR-RTO: 30 MG/G (ref 0–30)
POTASSIUM SERPL-SCNC: 4 MMOL/L (ref 3.5–5.1)
PROT SERPL-MCNC: 6.9 G/DL (ref 6.4–8.2)
SODIUM SERPL-SCNC: 140 MMOL/L (ref 136–145)
TRIGL SERPL-MCNC: 74 MG/DL (ref ?–150)
VLDLC SERPL CALC-MCNC: 14.8 MG/DL

## 2023-05-17 RX ORDER — POTASSIUM CHLORIDE 20 MEQ/1
TABLET, EXTENDED RELEASE ORAL
Qty: 90 TABLET | Refills: 3 | Status: SHIPPED | OUTPATIENT
Start: 2023-05-17

## 2023-06-05 RX ORDER — TIZANIDINE 4 MG/1
TABLET ORAL
Qty: 90 TABLET | OUTPATIENT
Start: 2023-06-05

## 2023-06-07 RX ORDER — TIZANIDINE 4 MG/1
TABLET ORAL
Qty: 90 TABLET | Refills: 1 | Status: SHIPPED | OUTPATIENT
Start: 2023-06-07

## 2023-06-19 RX ORDER — OMEPRAZOLE 40 MG/1
CAPSULE, DELAYED RELEASE ORAL
Qty: 90 CAPSULE | Refills: 3 | Status: SHIPPED | OUTPATIENT
Start: 2023-06-19

## 2023-08-22 RX ORDER — OXYBUTYNIN CHLORIDE 5 MG/1
5 TABLET ORAL 2 TIMES DAILY
Qty: 180 TABLET | Refills: 3 | Status: SHIPPED | OUTPATIENT
Start: 2023-08-22

## 2023-09-06 RX ORDER — METOPROLOL SUCCINATE 50 MG/1
TABLET, EXTENDED RELEASE ORAL
Qty: 135 TABLET | Refills: 3 | Status: SHIPPED | OUTPATIENT
Start: 2023-09-06

## 2023-09-19 ENCOUNTER — OFFICE VISIT (OUTPATIENT)
Age: 78
End: 2023-09-19
Payer: MEDICARE

## 2023-09-19 VITALS
SYSTOLIC BLOOD PRESSURE: 114 MMHG | BODY MASS INDEX: 23.58 KG/M2 | RESPIRATION RATE: 18 BRPM | DIASTOLIC BLOOD PRESSURE: 76 MMHG | HEART RATE: 58 BPM | OXYGEN SATURATION: 99 % | HEIGHT: 68 IN | TEMPERATURE: 97.1 F | WEIGHT: 155.6 LBS

## 2023-09-19 DIAGNOSIS — E78.00 PURE HYPERCHOLESTEROLEMIA, UNSPECIFIED: ICD-10-CM

## 2023-09-19 DIAGNOSIS — Z00.00 MEDICARE ANNUAL WELLNESS VISIT, SUBSEQUENT: Primary | ICD-10-CM

## 2023-09-19 DIAGNOSIS — I10 PRIMARY HYPERTENSION: ICD-10-CM

## 2023-09-19 DIAGNOSIS — Z23 ENCOUNTER FOR IMMUNIZATION: ICD-10-CM

## 2023-09-19 DIAGNOSIS — I12.9 HYPERTENSIVE CHRONIC KIDNEY DISEASE WITH STAGE 1 THROUGH STAGE 4 CHRONIC KIDNEY DISEASE, OR UNSPECIFIED CHRONIC KIDNEY DISEASE: ICD-10-CM

## 2023-09-19 DIAGNOSIS — I10 ESSENTIAL (PRIMARY) HYPERTENSION: ICD-10-CM

## 2023-09-19 DIAGNOSIS — M79.7 FIBROMYALGIA: ICD-10-CM

## 2023-09-19 PROCEDURE — 3074F SYST BP LT 130 MM HG: CPT | Performed by: FAMILY MEDICINE

## 2023-09-19 PROCEDURE — G0008 ADMIN INFLUENZA VIRUS VAC: HCPCS | Performed by: FAMILY MEDICINE

## 2023-09-19 PROCEDURE — 3078F DIAST BP <80 MM HG: CPT | Performed by: FAMILY MEDICINE

## 2023-09-19 PROCEDURE — 99214 OFFICE O/P EST MOD 30 MIN: CPT | Performed by: FAMILY MEDICINE

## 2023-09-19 PROCEDURE — 1123F ACP DISCUSS/DSCN MKR DOCD: CPT | Performed by: FAMILY MEDICINE

## 2023-09-19 PROCEDURE — 90694 VACC AIIV4 NO PRSRV 0.5ML IM: CPT | Performed by: FAMILY MEDICINE

## 2023-09-19 PROCEDURE — G0439 PPPS, SUBSEQ VISIT: HCPCS | Performed by: FAMILY MEDICINE

## 2023-09-19 SDOH — ECONOMIC STABILITY: FOOD INSECURITY: WITHIN THE PAST 12 MONTHS, THE FOOD YOU BOUGHT JUST DIDN'T LAST AND YOU DIDN'T HAVE MONEY TO GET MORE.: NEVER TRUE

## 2023-09-19 SDOH — ECONOMIC STABILITY: HOUSING INSECURITY
IN THE LAST 12 MONTHS, WAS THERE A TIME WHEN YOU DID NOT HAVE A STEADY PLACE TO SLEEP OR SLEPT IN A SHELTER (INCLUDING NOW)?: NO

## 2023-09-19 SDOH — ECONOMIC STABILITY: INCOME INSECURITY: HOW HARD IS IT FOR YOU TO PAY FOR THE VERY BASICS LIKE FOOD, HOUSING, MEDICAL CARE, AND HEATING?: NOT VERY HARD

## 2023-09-19 SDOH — ECONOMIC STABILITY: FOOD INSECURITY: WITHIN THE PAST 12 MONTHS, YOU WORRIED THAT YOUR FOOD WOULD RUN OUT BEFORE YOU GOT MONEY TO BUY MORE.: NEVER TRUE

## 2023-09-19 ASSESSMENT — LIFESTYLE VARIABLES
HOW MANY STANDARD DRINKS CONTAINING ALCOHOL DO YOU HAVE ON A TYPICAL DAY: PATIENT DOES NOT DRINK
HOW OFTEN DO YOU HAVE A DRINK CONTAINING ALCOHOL: NEVER

## 2023-09-19 ASSESSMENT — PATIENT HEALTH QUESTIONNAIRE - PHQ9
2. FEELING DOWN, DEPRESSED OR HOPELESS: 0
SUM OF ALL RESPONSES TO PHQ QUESTIONS 1-9: 0
SUM OF ALL RESPONSES TO PHQ QUESTIONS 1-9: 0
SUM OF ALL RESPONSES TO PHQ9 QUESTIONS 1 & 2: 0
1. LITTLE INTEREST OR PLEASURE IN DOING THINGS: 0
SUM OF ALL RESPONSES TO PHQ QUESTIONS 1-9: 0
SUM OF ALL RESPONSES TO PHQ QUESTIONS 1-9: 0

## 2023-09-19 NOTE — PATIENT INSTRUCTIONS
Flu shot, RSV vaccine and new COVID omicron vaccines due this fall at your pharmacy. Please get those when available, they can help prevent severe lung disease. You can also get a shingles booster for free there. Advance Directives: Care Instructions  Overview  An advance directive is a legal way to state your wishes at the end of your life. It tells your family and your doctor what to do if you can't say what you want. There are two main types of advance directives. You can change them any time your wishes change. Living will. This form tells your family and your doctor your wishes about life support and other treatment. The form is also called a declaration. Medical power of . This form lets you name a person to make treatment decisions for you when you can't speak for yourself. This person is called a health care agent (health care proxy, health care surrogate). The form is also called a durable power of  for health care. If you do not have an advance directive, decisions about your medical care may be made by a family member, or by a doctor or a  who doesn't know you. It may help to think of an advance directive as a gift to the people who care for you. If you have one, they won't have to make tough decisions by themselves. For more information, including forms for your state, see the [address] [place] [address] website (www.caringinfo.org/planning/advance-directives/). Follow-up care is a key part of your treatment and safety. Be sure to make and go to all appointments, and call your doctor if you are having problems. It's also a good idea to know your test results and keep a list of the medicines you take. What should you include in an advance directive? Many states have a unique advance directive form. (It may ask you to address specific issues.) Or you might use a universal form that's approved by many states.   If your form doesn't tell you what to address, it may be hard to know what to

## 2023-09-19 NOTE — PROGRESS NOTES
Juancho Cuellar is a 68 y.o. female  Chief Complaint   Patient presents with    Medicare AWV       Subjective:     Depression  Feeling better lately. Sleeping ok with ambien and prozac 40mg/d. Decreased appetite. Weigh down still lower since last visit. PHQ-9 Total Score: 0 (9/19/2023  9:29 AM)    Hypertension and CKD3. Blood pressures have been stable/in goal. Management at last visit included continuing current regimen . Current regimen: angiotensin II receptor antagonist, calcium channel blocker and thiazide diuretic. Symptoms include no symptoms. Patient denies chest pain, palpitations, peripheral edema, headache. Lab review:   Lab Results   Component Value Date     03/13/2023    K 4.0 03/13/2023     03/13/2023    CO2 32 03/13/2023    GLUCOSE 89 03/13/2023    BUN 19 03/13/2023    CREATININE 1.17 (H) 03/13/2023       Hyperlipidemia. On lipitor 40. Paulette well. No myalgias, arthralgias, unusual weakness. Lab Results   Component Value Date    CHOL 174 03/13/2023    HDL 84 03/13/2023    LDLCALC 75.2 03/13/2023    TRIG 74 03/13/2023    AST 18 03/13/2023    ALT 20 03/13/2023    ALKPHOS 76 03/13/2023    BILITOT 0.3 03/13/2023     GERD and hx colon polyps wit gallstone (large) on CT fall 2022  On prilosec. Working well, appetite normal, wt back to normal now. No melena/ BRBPR. Last HGB ok    Fibromyalgia  Remains on prozac, flexeril, mobic. Prev been Managed by Rheum Dr. Eladia Grijalva MD., rheumatologist at [facility], and Ortho at [facility] in [address] [place] [address]. Reviewed PMH, PSH, SH, Medications, allergies (see chart).   Current Outpatient Medications   Medication Sig    metoprolol succinate (TOPROL XL) 50 MG extended release tablet TAKE 1 AND 1/2 TABLETS BY MOUTH EVERY DAY FOR BLOOD PRESSURE    oxybutynin (DITROPAN) 5 MG tablet Take 1 tablet by mouth 2 times daily    omeprazole (PRILOSEC) 40 MG delayed release capsule TAKE 1 CAPSULE BY MOUTH DAILY    tiZANidine (ZANAFLEX) 4 MG tablet TAKE 1 TABLET BY MOUTH

## 2023-09-20 LAB
ANION GAP SERPL CALC-SCNC: 3 MMOL/L (ref 5–15)
BUN SERPL-MCNC: 31 MG/DL (ref 6–20)
BUN/CREAT SERPL: 19 (ref 12–20)
CALCIUM SERPL-MCNC: 9.5 MG/DL (ref 8.5–10.1)
CHLORIDE SERPL-SCNC: 103 MMOL/L (ref 97–108)
CO2 SERPL-SCNC: 30 MMOL/L (ref 21–32)
CREAT SERPL-MCNC: 1.62 MG/DL (ref 0.55–1.02)
GLUCOSE SERPL-MCNC: 90 MG/DL (ref 65–100)
HGB BLD-MCNC: 10.8 G/DL (ref 11.5–16)
POTASSIUM SERPL-SCNC: 3.7 MMOL/L (ref 3.5–5.1)
SODIUM SERPL-SCNC: 136 MMOL/L (ref 136–145)

## 2023-09-20 RX ORDER — MELOXICAM 15 MG/1
TABLET ORAL
Qty: 90 TABLET | Refills: 1 | OUTPATIENT
Start: 2023-09-20

## 2023-09-20 NOTE — RESULT ENCOUNTER NOTE
Labs show more irritated kidneys and not drinking enough fluids again. May also be irritation from meloxicam. Want to hold that for a month or so (replace with tylenol 8h, 2 tabs 3x/day as needed and tramadol on the bad days, as those don't irritate kidneys). I would like to recheck the kidney function in October to see how well these interventions worked. Orders entered, please inform pt and arrange lab visit in 3-5 wk.

## 2023-10-06 RX ORDER — AMLODIPINE BESYLATE 10 MG/1
TABLET ORAL
Qty: 90 TABLET | Refills: 3 | Status: SHIPPED | OUTPATIENT
Start: 2023-10-06

## 2023-10-09 RX ORDER — CHLORTHALIDONE 25 MG/1
TABLET ORAL
Qty: 90 TABLET | Refills: 3 | Status: SHIPPED | OUTPATIENT
Start: 2023-10-09

## 2023-10-09 RX ORDER — FLUOXETINE HYDROCHLORIDE 40 MG/1
CAPSULE ORAL
Qty: 90 CAPSULE | Refills: 3 | Status: SHIPPED | OUTPATIENT
Start: 2023-10-09

## 2023-10-13 RX ORDER — ATORVASTATIN CALCIUM 40 MG/1
TABLET, FILM COATED ORAL
Qty: 90 TABLET | Refills: 3 | Status: SHIPPED | OUTPATIENT
Start: 2023-10-13

## 2023-11-14 ENCOUNTER — NURSE ONLY (OUTPATIENT)
Age: 78
End: 2023-11-14

## 2023-11-14 DIAGNOSIS — I10 PRIMARY HYPERTENSION: ICD-10-CM

## 2023-11-14 DIAGNOSIS — I12.9 HYPERTENSIVE CHRONIC KIDNEY DISEASE WITH STAGE 1 THROUGH STAGE 4 CHRONIC KIDNEY DISEASE, OR UNSPECIFIED CHRONIC KIDNEY DISEASE: ICD-10-CM

## 2023-11-15 LAB
ANION GAP SERPL CALC-SCNC: 3 MMOL/L (ref 5–15)
BUN SERPL-MCNC: 24 MG/DL (ref 6–20)
BUN/CREAT SERPL: 20 (ref 12–20)
CALCIUM SERPL-MCNC: 9.5 MG/DL (ref 8.5–10.1)
CHLORIDE SERPL-SCNC: 101 MMOL/L (ref 97–108)
CO2 SERPL-SCNC: 30 MMOL/L (ref 21–32)
CREAT SERPL-MCNC: 1.2 MG/DL (ref 0.55–1.02)
GLUCOSE SERPL-MCNC: 75 MG/DL (ref 65–100)
POTASSIUM SERPL-SCNC: 3.9 MMOL/L (ref 3.5–5.1)
SODIUM SERPL-SCNC: 134 MMOL/L (ref 136–145)

## 2024-03-01 RX ORDER — MIRTAZAPINE 7.5 MG/1
7.5 TABLET, FILM COATED ORAL NIGHTLY
Qty: 90 TABLET | Refills: 2 | Status: SHIPPED | OUTPATIENT
Start: 2024-03-01

## 2024-03-06 ENCOUNTER — OFFICE VISIT (OUTPATIENT)
Age: 79
End: 2024-03-06
Payer: MEDICARE

## 2024-03-06 VITALS
HEIGHT: 68 IN | RESPIRATION RATE: 18 BRPM | OXYGEN SATURATION: 98 % | SYSTOLIC BLOOD PRESSURE: 132 MMHG | BODY MASS INDEX: 22.49 KG/M2 | TEMPERATURE: 97.5 F | HEART RATE: 54 BPM | WEIGHT: 148.4 LBS | DIASTOLIC BLOOD PRESSURE: 74 MMHG

## 2024-03-06 DIAGNOSIS — I71.21 ANEURYSM OF ASCENDING AORTA WITHOUT RUPTURE (HCC): ICD-10-CM

## 2024-03-06 DIAGNOSIS — I10 PRIMARY HYPERTENSION: ICD-10-CM

## 2024-03-06 DIAGNOSIS — N18.31 STAGE 3A CHRONIC KIDNEY DISEASE (HCC): ICD-10-CM

## 2024-03-06 DIAGNOSIS — I12.9 HYPERTENSIVE KIDNEY DISEASE WITH STAGE 3A CHRONIC KIDNEY DISEASE (HCC): ICD-10-CM

## 2024-03-06 DIAGNOSIS — N18.31 HYPERTENSIVE KIDNEY DISEASE WITH STAGE 3A CHRONIC KIDNEY DISEASE (HCC): ICD-10-CM

## 2024-03-06 DIAGNOSIS — R07.81 RIB PAIN: Primary | ICD-10-CM

## 2024-03-06 PROCEDURE — 3075F SYST BP GE 130 - 139MM HG: CPT | Performed by: FAMILY MEDICINE

## 2024-03-06 PROCEDURE — 3078F DIAST BP <80 MM HG: CPT | Performed by: FAMILY MEDICINE

## 2024-03-06 PROCEDURE — 99214 OFFICE O/P EST MOD 30 MIN: CPT | Performed by: FAMILY MEDICINE

## 2024-03-06 PROCEDURE — 1090F PRES/ABSN URINE INCON ASSESS: CPT | Performed by: FAMILY MEDICINE

## 2024-03-06 PROCEDURE — G8427 DOCREV CUR MEDS BY ELIG CLIN: HCPCS | Performed by: FAMILY MEDICINE

## 2024-03-06 PROCEDURE — G8484 FLU IMMUNIZE NO ADMIN: HCPCS | Performed by: FAMILY MEDICINE

## 2024-03-06 PROCEDURE — G8400 PT W/DXA NO RESULTS DOC: HCPCS | Performed by: FAMILY MEDICINE

## 2024-03-06 PROCEDURE — 1036F TOBACCO NON-USER: CPT | Performed by: FAMILY MEDICINE

## 2024-03-06 PROCEDURE — 1123F ACP DISCUSS/DSCN MKR DOCD: CPT | Performed by: FAMILY MEDICINE

## 2024-03-06 PROCEDURE — G8420 CALC BMI NORM PARAMETERS: HCPCS | Performed by: FAMILY MEDICINE

## 2024-03-06 RX ORDER — TRAMADOL HYDROCHLORIDE 50 MG/1
50 TABLET ORAL 2 TIMES DAILY PRN
Qty: 60 TABLET | Refills: 0 | Status: SHIPPED | OUTPATIENT
Start: 2024-03-06 | End: 2024-04-05

## 2024-03-06 RX ORDER — ONDANSETRON 4 MG/1
4 TABLET, FILM COATED ORAL 3 TIMES DAILY PRN
Qty: 15 TABLET | Refills: 0 | Status: SHIPPED | OUTPATIENT
Start: 2024-03-06

## 2024-03-06 ASSESSMENT — PATIENT HEALTH QUESTIONNAIRE - PHQ9
1. LITTLE INTEREST OR PLEASURE IN DOING THINGS: 0
SUM OF ALL RESPONSES TO PHQ QUESTIONS 1-9: 0
SUM OF ALL RESPONSES TO PHQ9 QUESTIONS 1 & 2: 0
2. FEELING DOWN, DEPRESSED OR HOPELESS: 0
SUM OF ALL RESPONSES TO PHQ QUESTIONS 1-9: 0

## 2024-03-06 NOTE — PROGRESS NOTES
Tiffanie Jj is a 78 y.o. female  Chief Complaint   Patient presents with    Follow-Up from Hospital     ER follow up- [facility] 2/27/24-Fall       Subjective:     Depression  Feeling better lately. Sleeping ok with ambien and prozac 40mg/d. Decreased appetite. Weigh down still lower since last visit.  PHQ-9 Total Score: 0 (3/6/2024  1:37 PM)    Hypertension and CKD3.   Blood pressures have been stable/in goal. Management at last visit included continuing current regimen .  Current regimen: angiotensin II receptor antagonist, calcium channel blocker and thiazide diuretic. Symptoms include no symptoms. Patient denies chest pain, palpitations, peripheral edema, headache.  Lab review:   Lab Results   Component Value Date     (L) 11/14/2023    K 3.9 11/14/2023     11/14/2023    CO2 30 11/14/2023    GLUCOSE 75 11/14/2023    BUN 24 (H) 11/14/2023    CREATININE 1.20 (H) 11/14/2023       Hyperlipidemia.  On lipitor 40. Paulette well. No myalgias, arthralgias, unusual weakness.  Lab Results   Component Value Date    CHOL 174 03/13/2023    HDL 84 03/13/2023    LDLCALC 75.2 03/13/2023    TRIG 74 03/13/2023    AST 18 03/13/2023    ALT 20 03/13/2023    ALKPHOS 76 03/13/2023    BILITOT 0.3 03/13/2023     GERD and hx colon polyps wit gallstone (large) on CT fall 2022  On prilosec. Working well, appetite normal, wt back to normal now. No melena/ BRBPR. Last HGB ok    Fibromyalgia  Remains on prozac, flexeril, mobic. Prev been Managed by Rheum Dr. Keegan Cook MD., rheumatologist at [facility], and Ortho at [facility] in [place].     Reviewed PMH, PSH, SH, Medications, allergies (see chart).  Current Outpatient Medications   Medication Sig    mirtazapine (REMERON) 7.5 MG tablet Take 1 tablet by mouth nightly    atorvastatin (LIPITOR) 40 MG tablet TAKE 1 TABLET BY MOUTH EVERY DAY FOR HEART AND CHOLESTEROL    chlorthalidone (HYGROTON) 25 MG tablet TAKE 1 TABLET BY MOUTH EVERY DAY FOR PRESSURE    FLUoxetine (PROZAC) 40 MG capsule

## 2024-03-06 NOTE — PROGRESS NOTES
iTffanie Jj is a 78 y.o. female presenting for/with:    Chief Complaint   Patient presents with    Follow-Up from Hospital     ER follow up- [facility] 2/27/24-Fall       Vitals:    03/06/24 1341   BP: 132/74   Site: Left Upper Arm   Position: Sitting   Cuff Size: Medium Adult   Pulse: 54   Resp: 18   Temp: 97.5 °F (36.4 °C)   TempSrc: Temporal   SpO2: 98%   Weight: 67.3 kg (148 lb 6.4 oz)   Height: 1.727 m (5' 8\")       Pain Scale: 7/10  Pain Location: Arm (right arm)    \"Have you been to the ER, urgent care clinic since your last visit?  Hospitalized since your last visit?\"    [facility] ER-2/27/24-Fall    “Have you seen or consulted any other health care providers outside of [facility] since your last visit?”    NO                 3/6/2024     1:37 PM   PHQ-9    Little interest or pleasure in doing things 0   Feeling down, depressed, or hopeless 0   PHQ-2 Score 0   PHQ-9 Total Score 0           3/6/2024     1:50 PM 2/18/2022    12:00 AM 6/25/2021    12:00 AM   [facility] AMB LEARNING ASSESSMENT   Primary Learner Patient Patient Patient   Primary Language ENGLISH ENGLISH ENGLISH   Learning Preference DEMONSTRATION DEMONSTRATION READING   Answered By patient patient patient   Relationship to Learner SELF SELF SELF            3/6/2024     1:38 PM   Amb Fall Risk Assessment and TUG Test   Do you feel unsteady or are you worried about falling?  no   2 or more falls in past year? no   Fall with injury in past year? no           3/6/2024     1:00 PM 9/19/2023     9:00 AM   ADL ASSESSMENT   Feeding yourself No Help Needed No Help Needed   Getting from bed to chair No Help Needed No Help Needed   Getting dressed No Help Needed No Help Needed   Bathing or showering No Help Needed No Help Needed   Walk across the room (includes cane/walker) No Help Needed No Help Needed   Using the telphone No Help Needed No Help Needed   Taking your medications No Help Needed No Help Needed   Preparing meals No Help Needed No Help Needed

## 2024-05-17 ENCOUNTER — OFFICE VISIT (OUTPATIENT)
Age: 79
End: 2024-05-17
Payer: MEDICARE

## 2024-05-17 VITALS
TEMPERATURE: 97.5 F | WEIGHT: 149 LBS | RESPIRATION RATE: 18 BRPM | HEIGHT: 68 IN | BODY MASS INDEX: 22.58 KG/M2 | OXYGEN SATURATION: 99 % | SYSTOLIC BLOOD PRESSURE: 140 MMHG | HEART RATE: 59 BPM | DIASTOLIC BLOOD PRESSURE: 86 MMHG

## 2024-05-17 DIAGNOSIS — E78.00 HIGH CHOLESTEROL: ICD-10-CM

## 2024-05-17 DIAGNOSIS — R53.83 OTHER FATIGUE: ICD-10-CM

## 2024-05-17 DIAGNOSIS — I71.21 ANEURYSM OF ASCENDING AORTA WITHOUT RUPTURE (HCC): ICD-10-CM

## 2024-05-17 DIAGNOSIS — M79.7 FIBROMYALGIA: ICD-10-CM

## 2024-05-17 DIAGNOSIS — N18.31 STAGE 3A CHRONIC KIDNEY DISEASE (HCC): ICD-10-CM

## 2024-05-17 DIAGNOSIS — I10 PRIMARY HYPERTENSION: Primary | ICD-10-CM

## 2024-05-17 PROCEDURE — 3079F DIAST BP 80-89 MM HG: CPT | Performed by: FAMILY MEDICINE

## 2024-05-17 PROCEDURE — 93010 ELECTROCARDIOGRAM REPORT: CPT | Performed by: FAMILY MEDICINE

## 2024-05-17 PROCEDURE — G8427 DOCREV CUR MEDS BY ELIG CLIN: HCPCS | Performed by: FAMILY MEDICINE

## 2024-05-17 PROCEDURE — G8420 CALC BMI NORM PARAMETERS: HCPCS | Performed by: FAMILY MEDICINE

## 2024-05-17 PROCEDURE — G8400 PT W/DXA NO RESULTS DOC: HCPCS | Performed by: FAMILY MEDICINE

## 2024-05-17 PROCEDURE — 93005 ELECTROCARDIOGRAM TRACING: CPT | Performed by: FAMILY MEDICINE

## 2024-05-17 PROCEDURE — 1036F TOBACCO NON-USER: CPT | Performed by: FAMILY MEDICINE

## 2024-05-17 PROCEDURE — 3077F SYST BP >= 140 MM HG: CPT | Performed by: FAMILY MEDICINE

## 2024-05-17 PROCEDURE — 99214 OFFICE O/P EST MOD 30 MIN: CPT | Performed by: FAMILY MEDICINE

## 2024-05-17 PROCEDURE — 1123F ACP DISCUSS/DSCN MKR DOCD: CPT | Performed by: FAMILY MEDICINE

## 2024-05-17 PROCEDURE — 1090F PRES/ABSN URINE INCON ASSESS: CPT | Performed by: FAMILY MEDICINE

## 2024-05-17 RX ORDER — POTASSIUM CHLORIDE 20 MEQ/1
20 TABLET, EXTENDED RELEASE ORAL DAILY
Qty: 90 TABLET | Refills: 3 | Status: SHIPPED | OUTPATIENT
Start: 2024-05-17

## 2024-05-17 RX ORDER — TIZANIDINE 4 MG/1
TABLET ORAL
Qty: 90 TABLET | Refills: 11 | Status: SHIPPED | OUTPATIENT
Start: 2024-05-17

## 2024-05-17 ASSESSMENT — PATIENT HEALTH QUESTIONNAIRE - PHQ9
SUM OF ALL RESPONSES TO PHQ QUESTIONS 1-9: 0
2. FEELING DOWN, DEPRESSED OR HOPELESS: NOT AT ALL
SUM OF ALL RESPONSES TO PHQ9 QUESTIONS 1 & 2: 0
SUM OF ALL RESPONSES TO PHQ QUESTIONS 1-9: 0
1. LITTLE INTEREST OR PLEASURE IN DOING THINGS: NOT AT ALL

## 2024-05-17 NOTE — PROGRESS NOTES
Tiffanie Jj is a 78 y.o. female presenting for/with:    Chief Complaint   Patient presents with    referral request     Dr. Saleem Spangler       Vitals:    05/17/24 0800   BP: (!) 140/86   Pulse: 59   Resp: 18   Temp: 97.5 °F (36.4 °C)   TempSrc: Temporal   SpO2: 99%   Weight: 67.6 kg (149 lb)   Height: 1.727 m (5' 8\")       Pain Scale: 0 - No pain/10  Pain Location:     \"Have you been to the ER, urgent care clinic since your last visit?  Hospitalized since your last visit?\"    NO    “Have you seen or consulted any other health care providers outside of [facility] since your last visit?”    NO                 5/17/2024     8:35 AM   PHQ-9    Little interest or pleasure in doing things 0   Feeling down, depressed, or hopeless 0   PHQ-2 Score 0   PHQ-9 Total Score 0           3/6/2024     1:50 PM 2/18/2022    12:00 AM 6/25/2021    12:00 AM   [facility] AMB LEARNING ASSESSMENT   Primary Learner Patient Patient Patient   Primary Language ENGLISH ENGLISH ENGLISH   Learning Preference DEMONSTRATION DEMONSTRATION READING   Answered By patient patient patient   Relationship to Learner SELF SELF SELF            5/17/2024     8:35 AM   Amb Fall Risk Assessment and TUG Test   Do you feel unsteady or are you worried about falling?  no   2 or more falls in past year? no   Fall with injury in past year? no           5/17/2024     8:00 AM 3/6/2024     1:00 PM 9/19/2023     9:00 AM   ADL ASSESSMENT   Feeding yourself No Help Needed No Help Needed No Help Needed   Getting from bed to chair No Help Needed No Help Needed No Help Needed   Getting dressed No Help Needed No Help Needed No Help Needed   Bathing or showering No Help Needed No Help Needed No Help Needed   Walk across the room (includes cane/walker) No Help Needed No Help Needed No Help Needed   Using the telphone No Help Needed No Help Needed No Help Needed   Taking your medications No Help Needed No Help Needed No Help Needed   Preparing meals No Help Needed

## 2024-05-17 NOTE — PROGRESS NOTES
Tiffanie Jj is a 78 y.o. female  Chief Complaint   Patient presents with    referral request     Dr. Monge    Fatigue       Subjective:     Depression  Feeling better lately. Sleeping ok with ambien and prozac 40mg/d. Decreased appetite. Weigh down still lower since last visit.  PHQ-9 Total Score: 0 (5/17/2024  8:35 AM)    Hypertension and CKD3.   Blood pressures have been up a little again. Management at last visit included continuing current regimen .  Current regimen: angiotensin II receptor antagonist, calcium channel blocker and thiazide diuretic. Symptoms include no symptoms. Patient denies chest pain, palpitations, peripheral edema, headache.  Lab review:   Lab Results   Component Value Date     (L) 11/14/2023    K 3.9 11/14/2023     11/14/2023    CO2 30 11/14/2023    GLUCOSE 75 11/14/2023    BUN 24 (H) 11/14/2023    CREATININE 1.20 (H) 11/14/2023       Hyperlipidemia.  On lipitor 40. Paulette well. No myalgias, arthralgias, unusual weakness.  Lab Results   Component Value Date    CHOL 174 03/13/2023    HDL 84 03/13/2023    TRIG 74 03/13/2023    AST 18 03/13/2023    ALT 20 03/13/2023    ALKPHOS 76 03/13/2023    BILITOT 0.3 03/13/2023     GERD and hx colon polyps wit gallstone (large) on CT fall 2022  On prilosec. Working well, appetite normal, wt back to normal now. No melena/ BRBPR. Last HGB ok    Fibromyalgia  Remains on prozac, flexeril, mobic. Prev been Managed by Rheum Dr. Keegan Cook MD., rheumatologist at [facility], and Ortho at Ortho [facility] in [place].     Reviewed PMH, PSH, SH, Medications, allergies (see chart).  Current Outpatient Medications   Medication Sig    ondansetron (ZOFRAN) 4 MG tablet Take 1 tablet by mouth 3 times daily as needed for Nausea or Vomiting    mirtazapine (REMERON) 7.5 MG tablet Take 1 tablet by mouth nightly    atorvastatin (LIPITOR) 40 MG tablet TAKE 1 TABLET BY MOUTH EVERY DAY FOR HEART AND CHOLESTEROL    chlorthalidone (HYGROTON) 25 MG tablet TAKE 1 TABLET

## 2024-05-18 LAB
ALBUMIN SERPL-MCNC: 4 G/DL (ref 3.5–5)
ALBUMIN/GLOB SERPL: 1.3 (ref 1.1–2.2)
ALP SERPL-CCNC: 101 U/L (ref 45–117)
ALT SERPL-CCNC: 23 U/L (ref 12–78)
ANION GAP SERPL CALC-SCNC: 6 MMOL/L (ref 5–15)
AST SERPL-CCNC: 20 U/L (ref 15–37)
BILIRUB SERPL-MCNC: 0.6 MG/DL (ref 0.2–1)
BUN SERPL-MCNC: 28 MG/DL (ref 6–20)
BUN/CREAT SERPL: 20 (ref 12–20)
CALCIUM SERPL-MCNC: 10 MG/DL (ref 8.5–10.1)
CHLORIDE SERPL-SCNC: 97 MMOL/L (ref 97–108)
CHOLEST SERPL-MCNC: 152 MG/DL
CO2 SERPL-SCNC: 31 MMOL/L (ref 21–32)
CREAT SERPL-MCNC: 1.38 MG/DL (ref 0.55–1.02)
GLOBULIN SER CALC-MCNC: 3.2 G/DL (ref 2–4)
GLUCOSE SERPL-MCNC: 78 MG/DL (ref 65–100)
HDLC SERPL-MCNC: 91 MG/DL
HDLC SERPL: 1.7 (ref 0–5)
HGB BLD-MCNC: 11.8 G/DL (ref 11.5–16)
LDLC SERPL CALC-MCNC: 50.2 MG/DL (ref 0–100)
POTASSIUM SERPL-SCNC: 3.7 MMOL/L (ref 3.5–5.1)
PROT SERPL-MCNC: 7.2 G/DL (ref 6.4–8.2)
SODIUM SERPL-SCNC: 134 MMOL/L (ref 136–145)
TRIGL SERPL-MCNC: 54 MG/DL
VLDLC SERPL CALC-MCNC: 10.8 MG/DL

## 2024-06-18 PROBLEM — I10 ESSENTIAL HYPERTENSION: Status: ACTIVE | Noted: 2020-10-16

## 2024-06-24 DIAGNOSIS — R07.81 RIB PAIN: ICD-10-CM

## 2024-06-25 RX ORDER — TRAMADOL HYDROCHLORIDE 50 MG/1
50 TABLET ORAL 2 TIMES DAILY PRN
Qty: 60 TABLET | Refills: 1 | Status: SHIPPED | OUTPATIENT
Start: 2024-06-25 | End: 2024-08-24

## 2024-06-27 ENCOUNTER — OFFICE VISIT (OUTPATIENT)
Age: 79
End: 2024-06-27
Payer: MEDICARE

## 2024-06-27 VITALS
SYSTOLIC BLOOD PRESSURE: 120 MMHG | OXYGEN SATURATION: 97 % | BODY MASS INDEX: 21.67 KG/M2 | RESPIRATION RATE: 20 BRPM | HEART RATE: 52 BPM | DIASTOLIC BLOOD PRESSURE: 88 MMHG | TEMPERATURE: 98.3 F | HEIGHT: 68 IN | WEIGHT: 143 LBS

## 2024-06-27 DIAGNOSIS — E78.00 HYPERCHOLESTEROLEMIA: ICD-10-CM

## 2024-06-27 DIAGNOSIS — N18.32 STAGE 3B CHRONIC KIDNEY DISEASE (HCC): ICD-10-CM

## 2024-06-27 DIAGNOSIS — I10 ESSENTIAL HYPERTENSION: ICD-10-CM

## 2024-06-27 DIAGNOSIS — Z12.31 SCREENING MAMMOGRAM FOR BREAST CANCER: Primary | ICD-10-CM

## 2024-06-27 DIAGNOSIS — I71.21 ANEURYSM OF ASCENDING AORTA WITHOUT RUPTURE (HCC): Primary | ICD-10-CM

## 2024-06-27 PROCEDURE — G8400 PT W/DXA NO RESULTS DOC: HCPCS | Performed by: INTERNAL MEDICINE

## 2024-06-27 PROCEDURE — 1123F ACP DISCUSS/DSCN MKR DOCD: CPT | Performed by: INTERNAL MEDICINE

## 2024-06-27 PROCEDURE — 99204 OFFICE O/P NEW MOD 45 MIN: CPT | Performed by: INTERNAL MEDICINE

## 2024-06-27 PROCEDURE — 3074F SYST BP LT 130 MM HG: CPT | Performed by: INTERNAL MEDICINE

## 2024-06-27 PROCEDURE — 1036F TOBACCO NON-USER: CPT | Performed by: INTERNAL MEDICINE

## 2024-06-27 PROCEDURE — G8428 CUR MEDS NOT DOCUMENT: HCPCS | Performed by: INTERNAL MEDICINE

## 2024-06-27 PROCEDURE — G8420 CALC BMI NORM PARAMETERS: HCPCS | Performed by: INTERNAL MEDICINE

## 2024-06-27 PROCEDURE — 1090F PRES/ABSN URINE INCON ASSESS: CPT | Performed by: INTERNAL MEDICINE

## 2024-06-27 PROCEDURE — 3079F DIAST BP 80-89 MM HG: CPT | Performed by: INTERNAL MEDICINE

## 2024-06-27 NOTE — PROGRESS NOTES
Identified pt with two pt identifiers(name and ). Reviewed record in preparation for visit and have obtained necessary documentation.  Chief Complaint   Patient presents with    New Patient    Valvular Heart Disease    Hypertension    Cholesterol Problem      /88 (Site: Left Upper Arm, Position: Sitting, Cuff Size: Medium Adult)   Pulse 52   Temp 98.3 °F (36.8 °C) (Temporal)   Resp 20   Ht 1.727 m (5' 8\")   Wt 64.9 kg (143 lb)   SpO2 97%   BMI 21.74 kg/m²       Medications reviewed/approved by provider.      Health Maintenance Review: Patient reminded of \"due or due soon\" health maintenance. I have asked the patient to contact his/her primary care provider (PCP) for follow-up on his/her health maintenance.    Coordination of Care Questionnaire:  :   1) Have you been to an emergency room, urgent care, or hospitalized since your last visit?  If yes, where when, and reason for visit? no       2. Have seen or consulted any other health care provider since your last visit?   If yes, where when, and reason for visit?  no      Patient is accompanied by self I have received verbal consent from Tiffanie Jj to discuss any/all medical information while they are present in the room.    
random brief chest pains but no prolonged chest pain or pressure.  There is no family history of aneurysm.    PERTINENT CARDIAC HISTORY: (Records reviewed and summarized below)  Ascending aortic aneurysm  ==> Chest CT 2/27/2024, ascending ao 4.8 cm  Coronary artery disease  ==> Minimal coronary calcification on chest CT 2/27/2024  Hypertension  Hyperlipidemia  Obstructive sleep apnea  CKD, stage III  Chronic anemia  GERD  Fibromyalgia  IBS  DJD  Depression    Past medical history, past surgical history, family history, social history, and medications were all reviewed with the patient today and updated as necessary.     Current Outpatient Medications   Medication Sig    traMADol (ULTRAM) 50 MG tablet Take 1 tablet by mouth 2 times daily as needed for Pain for up to 60 days. Max Daily Amount: 100 mg    potassium chloride (KLOR-CON M) 20 MEQ extended release tablet Take 1 tablet by mouth daily    tiZANidine (ZANAFLEX) 4 MG tablet TAKE 1 TABLET BY MOUTH THREE TIMES DAILY AS NEEDED FOR SPASM    ondansetron (ZOFRAN) 4 MG tablet Take 1 tablet by mouth 3 times daily as needed for Nausea or Vomiting    mirtazapine (REMERON) 7.5 MG tablet Take 1 tablet by mouth nightly    atorvastatin (LIPITOR) 40 MG tablet TAKE 1 TABLET BY MOUTH EVERY DAY FOR HEART AND CHOLESTEROL    chlorthalidone (HYGROTON) 25 MG tablet TAKE 1 TABLET BY MOUTH EVERY DAY FOR PRESSURE    FLUoxetine (PROZAC) 40 MG capsule TAKE 1 CAPSULE BY MOUTH DAILY FOR NERVES    amLODIPine (NORVASC) 10 MG tablet TAKE 1 TABLET BY MOUTH EVERY DAY FOR PRESSURE    metoprolol succinate (TOPROL XL) 50 MG extended release tablet TAKE 1 AND 1/2 TABLETS BY MOUTH EVERY DAY FOR BLOOD PRESSURE    oxybutynin (DITROPAN) 5 MG tablet Take 1 tablet by mouth 2 times daily    omeprazole (PRILOSEC) 40 MG delayed release capsule TAKE 1 CAPSULE BY MOUTH DAILY    ascorbic acid (VITAMIN C) 1000 MG tablet Take by mouth    aspirin 81 MG chewable tablet Take 1 tablet by mouth daily    cetirizine

## 2024-06-27 NOTE — PATIENT INSTRUCTIONS
I have ordered an echocardiogram for further evaluation of your heart and reevaluation of your aortic aneurysm.  We will contact you with the results.  I have placed an order for a follow-up chest CT angiogram to be done in 2 months at the end of August.  We will contact you with the results.

## 2024-07-05 RX ORDER — ONDANSETRON 4 MG/1
TABLET, FILM COATED ORAL
Qty: 15 TABLET | Refills: 0 | Status: SHIPPED | OUTPATIENT
Start: 2024-07-05

## 2024-07-05 RX ORDER — OMEPRAZOLE 40 MG/1
CAPSULE, DELAYED RELEASE ORAL
Qty: 90 CAPSULE | Refills: 3 | Status: SHIPPED | OUTPATIENT
Start: 2024-07-05

## 2024-07-19 ENCOUNTER — HOSPITAL ENCOUNTER (OUTPATIENT)
Facility: HOSPITAL | Age: 79
End: 2024-07-19
Attending: FAMILY MEDICINE
Payer: MEDICARE

## 2024-07-19 ENCOUNTER — HOSPITAL ENCOUNTER (OUTPATIENT)
Facility: HOSPITAL | Age: 79
End: 2024-07-19
Attending: INTERNAL MEDICINE
Payer: MEDICARE

## 2024-07-19 DIAGNOSIS — Z12.31 SCREENING MAMMOGRAM FOR BREAST CANCER: ICD-10-CM

## 2024-07-19 DIAGNOSIS — I71.21 ANEURYSM OF ASCENDING AORTA WITHOUT RUPTURE (HCC): ICD-10-CM

## 2024-07-19 LAB
ECHO AO ARCH DIAM: 4.4 CM
ECHO AO ASC DIAM: 4.5 CM
ECHO AO ROOT DIAM: 3.1 CM
ECHO AR MAX VEL PISA: 4.8 M/S
ECHO AV PEAK GRADIENT: 14 MMHG
ECHO AV PEAK VELOCITY: 1.9 M/S
ECHO AV REGURGITANT PHT: 719.1 MILLISECOND
ECHO EST RA PRESSURE: 3 MMHG
ECHO LA DIAMETER: 4.4 CM
ECHO LA TO AORTIC ROOT RATIO: 1.42
ECHO LA VOL A-L A2C: 62 ML (ref 22–52)
ECHO LA VOL A-L A4C: 88 ML (ref 22–52)
ECHO LA VOL MOD A2C: 60 ML (ref 22–52)
ECHO LA VOL MOD A4C: 78 ML (ref 22–52)
ECHO LA VOLUME AREA LENGTH: 75 ML
ECHO LV E' LATERAL VELOCITY: 8 CM/S
ECHO LV E' SEPTAL VELOCITY: 7 CM/S
ECHO LV FRACTIONAL SHORTENING: 38 % (ref 28–44)
ECHO LV INTERNAL DIMENSION DIASTOLIC: 3.2 CM (ref 3.9–5.3)
ECHO LV INTERNAL DIMENSION SYSTOLIC: 2 CM
ECHO LV IVSD: 1.1 CM (ref 0.6–0.9)
ECHO LV MASS 2D: 119.4 G (ref 67–162)
ECHO LV POSTERIOR WALL DIASTOLIC: 1.3 CM (ref 0.6–0.9)
ECHO LV RELATIVE WALL THICKNESS RATIO: 0.81
ECHO LVOT AREA: 3.5 CM2
ECHO LVOT DIAM: 2.1 CM
ECHO MV A VELOCITY: 0.74 M/S
ECHO MV E DECELERATION TIME (DT): 225.5 MS
ECHO MV E VELOCITY: 0.77 M/S
ECHO MV E/A RATIO: 1.04
ECHO MV E/E' LATERAL: 9.63
ECHO MV E/E' RATIO (AVERAGED): 10.31
ECHO MV E/E' SEPTAL: 11
ECHO PULMONARY ARTERY SYSTOLIC PRESSURE (PASP): 35 MMHG
ECHO RA AREA 4C: 19.9 CM2
ECHO RIGHT VENTRICULAR SYSTOLIC PRESSURE (RVSP): 30 MMHG
ECHO RV BASAL DIMENSION: 4.2 CM
ECHO RV TAPSE: 1.8 CM (ref 1.7–?)
ECHO TV REGURGITANT MAX VELOCITY: 2.6 M/S
ECHO TV REGURGITANT PEAK GRADIENT: 27 MMHG

## 2024-07-19 PROCEDURE — 93306 TTE W/DOPPLER COMPLETE: CPT

## 2024-07-19 PROCEDURE — 93306 TTE W/DOPPLER COMPLETE: CPT | Performed by: INTERNAL MEDICINE

## 2024-07-19 PROCEDURE — 77063 BREAST TOMOSYNTHESIS BI: CPT

## 2024-07-19 NOTE — RESULT ENCOUNTER NOTE
Echo demonstrates moderately dilated ascending aorta with a diameter just about the same as the 2/27/2024 chest CT. The heart functions is normal. There are some age-related changes to the valves with mild mitral and aortic valve leakiness. This is not a concern. Will follow up on the results of the 6-month CT scan when it is completed. Thanks!

## 2024-08-28 ENCOUNTER — HOSPITAL ENCOUNTER (OUTPATIENT)
Facility: HOSPITAL | Age: 79
Discharge: HOME | End: 2024-08-31
Attending: INTERNAL MEDICINE
Payer: MEDICARE

## 2024-08-28 DIAGNOSIS — I71.21 ANEURYSM OF ASCENDING AORTA WITHOUT RUPTURE (HCC): ICD-10-CM

## 2024-08-28 PROCEDURE — 71275 CT ANGIOGRAPHY CHEST: CPT

## 2024-08-28 PROCEDURE — 6360000004 HC RX CONTRAST MEDICATION: Performed by: INTERNAL MEDICINE

## 2024-08-28 RX ORDER — IOPAMIDOL 755 MG/ML
100 INJECTION, SOLUTION INTRAVASCULAR
Status: COMPLETED | OUTPATIENT
Start: 2024-08-28 | End: 2024-08-28

## 2024-08-28 RX ADMIN — IOPAMIDOL 100 ML: 755 INJECTION, SOLUTION INTRAVENOUS at 09:59

## 2024-09-03 ENCOUNTER — TELEPHONE (OUTPATIENT)
Age: 79
End: 2024-09-03

## 2024-09-03 NOTE — TELEPHONE ENCOUNTER
----- Message from Dr. Óscar Monge MD sent at 9/2/2024  4:54 PM EDT -----  Chest CTA measured the ascending aorta at 4.3 x 4.5 cm.  This is smaller than the diameter (4.8 cm) reported on the 2/27/2024 chest CT and more consistent with the diameter of 4.5 cm measured by echo on 6/27/2024.  Let's plan on repeating the echo or CT scan in 1 year.  In the meantime, continue current medications for blood pressure control as well as the atorvastatin and daily aspirin.

## 2024-09-06 ENCOUNTER — TELEPHONE (OUTPATIENT)
Age: 79
End: 2024-09-06

## 2024-09-06 RX ORDER — OXYBUTYNIN CHLORIDE 5 MG/1
5 TABLET ORAL 2 TIMES DAILY
Qty: 180 TABLET | Refills: 3 | Status: SHIPPED | OUTPATIENT
Start: 2024-09-06

## 2024-09-09 RX ORDER — METOPROLOL SUCCINATE 50 MG/1
TABLET, EXTENDED RELEASE ORAL
Qty: 135 TABLET | Refills: 3 | Status: SHIPPED | OUTPATIENT
Start: 2024-09-09

## 2024-09-27 ENCOUNTER — OFFICE VISIT (OUTPATIENT)
Age: 79
End: 2024-09-27

## 2024-09-27 VITALS
SYSTOLIC BLOOD PRESSURE: 132 MMHG | HEART RATE: 61 BPM | OXYGEN SATURATION: 98 % | TEMPERATURE: 97.9 F | BODY MASS INDEX: 21.86 KG/M2 | DIASTOLIC BLOOD PRESSURE: 68 MMHG | WEIGHT: 144.2 LBS | RESPIRATION RATE: 18 BRPM | HEIGHT: 68 IN

## 2024-09-27 DIAGNOSIS — M47.816 LUMBAR SPONDYLOSIS: ICD-10-CM

## 2024-09-27 DIAGNOSIS — I10 ESSENTIAL HYPERTENSION: ICD-10-CM

## 2024-09-27 DIAGNOSIS — M16.12 PRIMARY OSTEOARTHRITIS OF LEFT HIP: ICD-10-CM

## 2024-09-27 DIAGNOSIS — N18.32 CKD STAGE 3B, GFR 30-44 ML/MIN (HCC): ICD-10-CM

## 2024-09-27 DIAGNOSIS — Z00.00 MEDICARE ANNUAL WELLNESS VISIT, SUBSEQUENT: Primary | ICD-10-CM

## 2024-09-27 DIAGNOSIS — Z51.81 THERAPEUTIC DRUG MONITORING: ICD-10-CM

## 2024-09-27 DIAGNOSIS — M17.12 PRIMARY LOCALIZED OSTEOARTHROSIS OF LEFT LOWER LEG: ICD-10-CM

## 2024-09-27 DIAGNOSIS — Z23 NEEDS FLU SHOT: ICD-10-CM

## 2024-09-27 LAB
ANION GAP SERPL CALC-SCNC: 4 MMOL/L (ref 2–12)
BUN SERPL-MCNC: 24 MG/DL (ref 6–20)
BUN/CREAT SERPL: 19 (ref 12–20)
CALCIUM SERPL-MCNC: 10 MG/DL (ref 8.5–10.1)
CHLORIDE SERPL-SCNC: 97 MMOL/L (ref 97–108)
CO2 SERPL-SCNC: 33 MMOL/L (ref 21–32)
CREAT SERPL-MCNC: 1.25 MG/DL (ref 0.55–1.02)
CREAT UR-MCNC: 39.9 MG/DL
GLUCOSE SERPL-MCNC: 75 MG/DL (ref 65–100)
MICROALBUMIN UR-MCNC: 1.18 MG/DL
MICROALBUMIN/CREAT UR-RTO: 30 MG/G (ref 0–30)
POTASSIUM SERPL-SCNC: 3.6 MMOL/L (ref 3.5–5.1)
SODIUM SERPL-SCNC: 134 MMOL/L (ref 136–145)

## 2024-09-27 RX ORDER — TRAMADOL HYDROCHLORIDE 50 MG/1
50 TABLET ORAL 2 TIMES DAILY PRN
Qty: 60 TABLET | Refills: 2 | Status: SHIPPED | OUTPATIENT
Start: 2024-09-27 | End: 2024-12-26

## 2024-09-27 RX ORDER — PREDNISONE 20 MG/1
TABLET ORAL
Qty: 11 TABLET | Refills: 0 | Status: SHIPPED | OUTPATIENT
Start: 2024-09-27 | End: 2024-10-05

## 2024-09-27 SDOH — ECONOMIC STABILITY: FOOD INSECURITY: WITHIN THE PAST 12 MONTHS, THE FOOD YOU BOUGHT JUST DIDN'T LAST AND YOU DIDN'T HAVE MONEY TO GET MORE.: NEVER TRUE

## 2024-09-27 SDOH — ECONOMIC STABILITY: INCOME INSECURITY: HOW HARD IS IT FOR YOU TO PAY FOR THE VERY BASICS LIKE FOOD, HOUSING, MEDICAL CARE, AND HEATING?: NOT VERY HARD

## 2024-09-27 SDOH — ECONOMIC STABILITY: FOOD INSECURITY: WITHIN THE PAST 12 MONTHS, YOU WORRIED THAT YOUR FOOD WOULD RUN OUT BEFORE YOU GOT MONEY TO BUY MORE.: NEVER TRUE

## 2024-09-27 ASSESSMENT — PATIENT HEALTH QUESTIONNAIRE - PHQ9
SUM OF ALL RESPONSES TO PHQ QUESTIONS 1-9: 2
1. LITTLE INTEREST OR PLEASURE IN DOING THINGS: SEVERAL DAYS
SUM OF ALL RESPONSES TO PHQ QUESTIONS 1-9: 2
SUM OF ALL RESPONSES TO PHQ9 QUESTIONS 1 & 2: 2
2. FEELING DOWN, DEPRESSED OR HOPELESS: SEVERAL DAYS
SUM OF ALL RESPONSES TO PHQ QUESTIONS 1-9: 2
SUM OF ALL RESPONSES TO PHQ QUESTIONS 1-9: 2

## 2024-09-30 LAB
AMPHETAMINES UR QL SCN: NEGATIVE NG/ML
BARBITURATES UR QL SCN: NEGATIVE NG/ML
BENZODIAZ UR QL SCN: NEGATIVE NG/ML
BZE UR QL SCN: NEGATIVE NG/ML
CANNABINOIDS UR QL SCN: NEGATIVE NG/ML
CREAT UR-MCNC: 38.5 MG/DL (ref 20–300)
LABORATORY COMMENT REPORT: NORMAL
METHADONE UR QL SCN: NEGATIVE NG/ML
OPIATES UR QL SCN: NEGATIVE NG/ML
OXYCODONE+OXYMORPHONE UR QL SCN: NEGATIVE NG/ML
PCP UR QL: NEGATIVE NG/ML
PH UR: 6.4 (ref 4.5–8.9)
PROPOXYPH UR QL SCN: NEGATIVE NG/ML

## 2024-10-07 RX ORDER — AMLODIPINE BESYLATE 10 MG/1
TABLET ORAL
Qty: 90 TABLET | Refills: 3 | Status: SHIPPED | OUTPATIENT
Start: 2024-10-07

## 2024-10-07 RX ORDER — CHLORTHALIDONE 25 MG/1
25 TABLET ORAL DAILY
Qty: 90 TABLET | Refills: 3 | Status: SHIPPED | OUTPATIENT
Start: 2024-10-07

## 2024-10-22 RX ORDER — ATORVASTATIN CALCIUM 40 MG/1
40 TABLET, FILM COATED ORAL DAILY
Qty: 90 TABLET | Refills: 3 | Status: SHIPPED | OUTPATIENT
Start: 2024-10-22

## 2024-11-01 DIAGNOSIS — M79.7 FIBROMYALGIA: Primary | ICD-10-CM

## 2024-11-01 RX ORDER — FLUOXETINE 40 MG/1
40 CAPSULE ORAL DAILY
Qty: 90 CAPSULE | Refills: 3 | Status: SHIPPED | OUTPATIENT
Start: 2024-11-01

## 2025-01-10 ENCOUNTER — OFFICE VISIT (OUTPATIENT)
Age: 80
End: 2025-01-10

## 2025-01-10 VITALS
SYSTOLIC BLOOD PRESSURE: 145 MMHG | WEIGHT: 146.2 LBS | RESPIRATION RATE: 18 BRPM | OXYGEN SATURATION: 99 % | DIASTOLIC BLOOD PRESSURE: 82 MMHG | TEMPERATURE: 97.8 F | HEART RATE: 51 BPM | HEIGHT: 68 IN | BODY MASS INDEX: 22.16 KG/M2

## 2025-01-10 DIAGNOSIS — K43.9 HERNIA, LATERAL VENTRAL: ICD-10-CM

## 2025-01-10 DIAGNOSIS — I71.21 ANEURYSM OF ASCENDING AORTA WITHOUT RUPTURE (HCC): ICD-10-CM

## 2025-01-10 DIAGNOSIS — N18.32 CKD STAGE 3B, GFR 30-44 ML/MIN (HCC): ICD-10-CM

## 2025-01-10 DIAGNOSIS — K59.01 SLOW TRANSIT CONSTIPATION: ICD-10-CM

## 2025-01-10 DIAGNOSIS — N28.89 RENAL MASS, LEFT: Primary | ICD-10-CM

## 2025-01-10 DIAGNOSIS — K86.89 DILATION OF PANCREATIC DUCT: ICD-10-CM

## 2025-01-10 DIAGNOSIS — G47.01 INSOMNIA DUE TO MEDICAL CONDITION: ICD-10-CM

## 2025-01-10 RX ORDER — MIRTAZAPINE 7.5 MG/1
7.5 TABLET, FILM COATED ORAL NIGHTLY
Qty: 90 TABLET | Refills: 3 | Status: SHIPPED | OUTPATIENT
Start: 2025-01-10

## 2025-01-10 RX ORDER — LOSARTAN POTASSIUM 50 MG/1
50 TABLET ORAL DAILY
Qty: 90 TABLET | Refills: 3 | Status: SHIPPED | OUTPATIENT
Start: 2025-01-10

## 2025-01-10 SDOH — ECONOMIC STABILITY: FOOD INSECURITY: WITHIN THE PAST 12 MONTHS, YOU WORRIED THAT YOUR FOOD WOULD RUN OUT BEFORE YOU GOT MONEY TO BUY MORE.: NEVER TRUE

## 2025-01-10 SDOH — ECONOMIC STABILITY: FOOD INSECURITY: WITHIN THE PAST 12 MONTHS, THE FOOD YOU BOUGHT JUST DIDN'T LAST AND YOU DIDN'T HAVE MONEY TO GET MORE.: NEVER TRUE

## 2025-01-10 ASSESSMENT — PATIENT HEALTH QUESTIONNAIRE - PHQ9
SUM OF ALL RESPONSES TO PHQ QUESTIONS 1-9: 0
SUM OF ALL RESPONSES TO PHQ QUESTIONS 1-9: 0
1. LITTLE INTEREST OR PLEASURE IN DOING THINGS: NOT AT ALL
SUM OF ALL RESPONSES TO PHQ QUESTIONS 1-9: 0
2. FEELING DOWN, DEPRESSED OR HOPELESS: NOT AT ALL
SUM OF ALL RESPONSES TO PHQ QUESTIONS 1-9: 0
SUM OF ALL RESPONSES TO PHQ9 QUESTIONS 1 & 2: 0

## 2025-01-10 NOTE — PATIENT INSTRUCTIONS
Home health certification received via fax. Form in your mailbox to be signed.     To keep the stools soft and easy to pass, make sure to get plenty of fiber in your diet, and ok to take 1-2 scoops miralax daily to keep stools soft.

## 2025-01-10 NOTE — PROGRESS NOTES
showering No Help Needed No Help Needed No Help Needed No Help Needed No Help Needed   Walk across the room (includes cane/walker) No Help Needed No Help Needed No Help Needed No Help Needed No Help Needed   Using the telphone No Help Needed No Help Needed No Help Needed No Help Needed No Help Needed   Taking your medications No Help Needed No Help Needed No Help Needed No Help Needed No Help Needed   Preparing meals No Help Needed No Help Needed No Help Needed No Help Needed No Help Needed   Managing money (expenses/bills) No Help Needed No Help Needed No Help Needed No Help Needed No Help Needed   Moderately strenuous housework (laundry) No Help Needed No Help Needed No Help Needed No Help Needed No Help Needed   Shopping for personal items (toiletries/medicines) No Help Needed No Help Needed No Help Needed No Help Needed No Help Needed   Shopping for groceries No Help Needed No Help Needed No Help Needed No Help Needed No Help Needed   Driving No Help Needed No Help Needed No Help Needed No Help Needed No Help Needed   Climbing a flight of stairs No Help Needed No Help Needed No Help Needed No Help Needed No Help Needed   Getting to places beyond walking distances No Help Needed No Help Needed No Help Needed No Help Needed No Help Needed           1/10/2025    11:00 AM   AMB Abuse Screening   Do you ever feel afraid of your partner? N   Are you in a relationship with someone who physically or mentally threatens you? N   Is it safe for you to go home? Y       Advance Care Planning     The patient has appointed the following active healthcare agents:    Primary Decision Maker: Becky Jolly - Other - [phone]    Primary Decision Maker: Thomas Jj [employer] - [phone]    Primary Decision Maker: April Perez - Other - [phone]  
ascites, or free air.    Evaluation of the pelvic viscera is degraded by streak artifact produced by the patient's bilateral total hip arthroplasties. No gross pelvic mass or free fluid.    No aggressive osseous lesions or acute osseous abnormality.  Exam End: 12/11/24 10:23     Ref Range & Units 12/11/24 1001   ISTAT Creatinine  0.6 - 1.3 mg/dL 1.2     Lab Results   Component Value Date/Time     09/27/2024 10:16 AM    K 3.6 09/27/2024 10:16 AM    CL 97 09/27/2024 10:16 AM    CO2 33 09/27/2024 10:16 AM    BUN 24 09/27/2024 10:16 AM    CREATININE 1.25 09/27/2024 10:16 AM    GLUCOSE 75 09/27/2024 10:16 AM    CALCIUM 10.0 09/27/2024 10:16 AM    LABGLOM 44 09/27/2024 10:16 AM    LABGLOM 46 11/14/2023 09:17 AM    LABGLOM 48 03/13/2023 11:11 AM        A/p:  L renal lesion  Previously thought to be cysts, as pt has long history of renal cysts, but these seem to be enlarged compared to previous scans. Check MRI abd with contrast. Consider urology consult.    Gallstones  With minimal sx, but dilated pancreatic duct. Check MRI Abd to further eval pancreas.    Ascending ao aneurysm  Mild, stable to a little smaller than 2/2024 check. Seeing DR. Monge in APR 2025 for recheck. BP ok, con't current tx: ARB, statin, asa, josiane well.    F/U per study/ PRN

## 2025-01-30 ENCOUNTER — HOSPITAL ENCOUNTER (OUTPATIENT)
Facility: HOSPITAL | Age: 80
Discharge: HOME | End: 2025-01-30
Attending: FAMILY MEDICINE
Payer: MEDICARE

## 2025-01-30 DIAGNOSIS — K86.89 DILATION OF PANCREATIC DUCT: ICD-10-CM

## 2025-01-30 DIAGNOSIS — N28.89 RENAL MASS, LEFT: ICD-10-CM

## 2025-01-30 PROCEDURE — 74183 MRI ABD W/O CNTR FLWD CNTR: CPT

## 2025-01-30 PROCEDURE — 6360000004 HC RX CONTRAST MEDICATION: Performed by: FAMILY MEDICINE

## 2025-01-30 PROCEDURE — A9577 INJ MULTIHANCE: HCPCS | Performed by: FAMILY MEDICINE

## 2025-01-30 RX ADMIN — GADOBENATE DIMEGLUMINE 13 ML: 529 INJECTION, SOLUTION INTRAVENOUS at 11:08

## 2025-03-10 RX ORDER — ONDANSETRON 4 MG/1
TABLET, FILM COATED ORAL
Qty: 15 TABLET | Refills: 0 | Status: SHIPPED | OUTPATIENT
Start: 2025-03-10

## 2025-04-03 DIAGNOSIS — M16.12 PRIMARY OSTEOARTHRITIS OF LEFT HIP: ICD-10-CM

## 2025-04-03 DIAGNOSIS — M47.816 LUMBAR SPONDYLOSIS: ICD-10-CM

## 2025-04-03 DIAGNOSIS — M17.12 PRIMARY LOCALIZED OSTEOARTHROSIS OF LEFT LOWER LEG: ICD-10-CM

## 2025-04-03 RX ORDER — OXYBUTYNIN CHLORIDE 5 MG/1
5 TABLET ORAL 2 TIMES DAILY
Qty: 180 TABLET | Refills: 3 | Status: SHIPPED | OUTPATIENT
Start: 2025-04-03

## 2025-04-03 RX ORDER — TRAMADOL HYDROCHLORIDE 50 MG/1
50 TABLET ORAL 2 TIMES DAILY PRN
Qty: 60 TABLET | Refills: 0 | Status: SHIPPED | OUTPATIENT
Start: 2025-04-03 | End: 2025-07-02

## 2025-04-24 ENCOUNTER — OFFICE VISIT (OUTPATIENT)
Age: 80
End: 2025-04-24
Payer: MEDICARE

## 2025-04-24 VITALS
HEIGHT: 68 IN | WEIGHT: 137 LBS | HEART RATE: 65 BPM | DIASTOLIC BLOOD PRESSURE: 88 MMHG | TEMPERATURE: 97 F | OXYGEN SATURATION: 95 % | SYSTOLIC BLOOD PRESSURE: 122 MMHG | BODY MASS INDEX: 20.76 KG/M2 | RESPIRATION RATE: 18 BRPM

## 2025-04-24 DIAGNOSIS — I10 PRIMARY HYPERTENSION: ICD-10-CM

## 2025-04-24 DIAGNOSIS — I71.21 ANEURYSM OF ASCENDING AORTA WITHOUT RUPTURE: Primary | ICD-10-CM

## 2025-04-24 DIAGNOSIS — N18.32 CKD STAGE 3B, GFR 30-44 ML/MIN (HCC): ICD-10-CM

## 2025-04-24 DIAGNOSIS — E78.00 HYPERCHOLESTEROLEMIA: ICD-10-CM

## 2025-04-24 PROCEDURE — G8420 CALC BMI NORM PARAMETERS: HCPCS | Performed by: INTERNAL MEDICINE

## 2025-04-24 PROCEDURE — 1159F MED LIST DOCD IN RCRD: CPT | Performed by: INTERNAL MEDICINE

## 2025-04-24 PROCEDURE — 1126F AMNT PAIN NOTED NONE PRSNT: CPT | Performed by: INTERNAL MEDICINE

## 2025-04-24 PROCEDURE — 1123F ACP DISCUSS/DSCN MKR DOCD: CPT | Performed by: INTERNAL MEDICINE

## 2025-04-24 PROCEDURE — 93000 ELECTROCARDIOGRAM COMPLETE: CPT | Performed by: INTERNAL MEDICINE

## 2025-04-24 PROCEDURE — 99214 OFFICE O/P EST MOD 30 MIN: CPT | Performed by: INTERNAL MEDICINE

## 2025-04-24 PROCEDURE — G8427 DOCREV CUR MEDS BY ELIG CLIN: HCPCS | Performed by: INTERNAL MEDICINE

## 2025-04-24 PROCEDURE — 3079F DIAST BP 80-89 MM HG: CPT | Performed by: INTERNAL MEDICINE

## 2025-04-24 PROCEDURE — 1090F PRES/ABSN URINE INCON ASSESS: CPT | Performed by: INTERNAL MEDICINE

## 2025-04-24 PROCEDURE — 1036F TOBACCO NON-USER: CPT | Performed by: INTERNAL MEDICINE

## 2025-04-24 PROCEDURE — G8400 PT W/DXA NO RESULTS DOC: HCPCS | Performed by: INTERNAL MEDICINE

## 2025-04-24 PROCEDURE — 3074F SYST BP LT 130 MM HG: CPT | Performed by: INTERNAL MEDICINE

## 2025-04-24 NOTE — PROGRESS NOTES
Identified pt with two pt identifiers(name and ). Reviewed record in preparation for visit and have obtained necessary documentation.  Chief Complaint   Patient presents with    Valvular Heart Disease    Hypertension      /88 (BP Site: Left Upper Arm, Patient Position: Sitting, BP Cuff Size: Medium Adult)   Pulse 65   Temp 97 °F (36.1 °C) (Temporal)   Resp 18   Ht 1.727 m (5' 8\")   Wt 62.1 kg (137 lb)   SpO2 95%   BMI 20.83 kg/m²       Medications reviewed/approved by provider.      Health Maintenance Review: Patient reminded of \"due or due soon\" health maintenance. I have asked the patient to contact his/her primary care provider (PCP) for follow-up on his/her health maintenance.    Coordination of Care Questionnaire:  :   1) Have you been to an emergency room, urgent care, or hospitalized since your last visit?  If yes, where when, and reason for visit? no       2. Have seen or consulted any other health care provider since your last visit?   If yes, where when, and reason for visit?  no      Patient is accompanied by self I have received verbal consent from Tiffanie Jj to discuss any/all medical information while they are present in the room.    
the right upper sternal border, soft 1/6 diastolic murmur at the left sternal border, no rub, no JVD, normal carotid upstrokes, no carotid bruits  Respiratory: Adequate air movement with normal effort, clear to auscultation, no wheezes, no ronchi, no rales  Abdomen: Soft, nondistended, normal bowel sounds. No audible bruits  Extremities: Warm and well perfused, normal cap refill, no clubbing or cyanosis. No edema  Neuro: No focal neurologic abnormalities      Pertinent studies and test results were reviewed with the patient today.     CBC   Lab Results   Component Value Date    WBC 7.3 05/20/2022    HGB 11.8 05/17/2024    HCT 36.2 05/20/2022    .8 (H) 05/20/2022     05/20/2022       CMP  Lab Results   Component Value Date     (L) 09/27/2024    K 3.6 09/27/2024    CL 97 09/27/2024    CO2 33 (H) 09/27/2024    BUN 24 (H) 09/27/2024    CREATININE 1.25 (H) 09/27/2024    GLUCOSE 75 09/27/2024    CALCIUM 10.0 09/27/2024    BILITOT 0.6 05/17/2024    ALKPHOS 101 05/17/2024    AST 20 05/17/2024    ALT 23 05/17/2024    LABGLOM 44 (L) 09/27/2024    GFRAA 52 (L) 09/20/2022    AGRATIO 1.3 03/13/2023    GLOB 3.2 05/17/2024       LIPIDS  Lab Results   Component Value Date/Time    CHOL 152 05/17/2024 10:15 AM    TRIG 54 05/17/2024 10:15 AM    HDL 91 05/17/2024 10:15 AM    CHOLHDLRATIO 1.7 05/17/2024 10:15 AM       Current EKG: (EKG has been independently visualized by me with interpretation below)  Sinus bradycardia at 55 bpm.  First-degree AV block, MD interval approximately 280 ms, no significant ST or T wave changes.        Óscar Monge MD   4/24/2025  11:26 AM

## 2025-04-24 NOTE — PATIENT INSTRUCTIONS
I have ordered an echocardiogram to be done in 7/2025 for reevaluation of your ascending aorta.  We will contact you with the results.

## 2025-04-28 ENCOUNTER — TELEPHONE (OUTPATIENT)
Age: 80
End: 2025-04-28

## 2025-04-28 NOTE — TELEPHONE ENCOUNTER
Patient:  Tiffanie Jj  :  1945    Patient identified with two identifiers.    Ayanna ([facility] Scheduling) called. She has tried calling patient several times with no response.    Just wanted Dr. Monge to know she cannot reach patient.

## 2025-04-29 NOTE — TELEPHONE ENCOUNTER
Pt was provided with central scheduling phone number on 4/24.    This nurse will mail a scheduling reminder if pt hasn't scheduled test by 5/2.

## 2025-05-12 ENCOUNTER — OFFICE VISIT (OUTPATIENT)
Age: 80
End: 2025-05-12
Payer: MEDICARE

## 2025-05-12 VITALS
TEMPERATURE: 97.2 F | WEIGHT: 139.6 LBS | HEIGHT: 68 IN | SYSTOLIC BLOOD PRESSURE: 132 MMHG | HEART RATE: 52 BPM | DIASTOLIC BLOOD PRESSURE: 68 MMHG | OXYGEN SATURATION: 99 % | BODY MASS INDEX: 21.16 KG/M2 | RESPIRATION RATE: 18 BRPM

## 2025-05-12 DIAGNOSIS — Z51.81 THERAPEUTIC DRUG MONITORING: ICD-10-CM

## 2025-05-12 DIAGNOSIS — E78.00 HYPERCHOLESTEROLEMIA: ICD-10-CM

## 2025-05-12 DIAGNOSIS — M47.816 LUMBAR SPONDYLOSIS: ICD-10-CM

## 2025-05-12 DIAGNOSIS — I71.21 ANEURYSM OF ASCENDING AORTA WITHOUT RUPTURE: ICD-10-CM

## 2025-05-12 DIAGNOSIS — N18.31 HYPERTENSIVE KIDNEY DISEASE WITH STAGE 3A CHRONIC KIDNEY DISEASE (HCC): ICD-10-CM

## 2025-05-12 DIAGNOSIS — N18.32 CKD STAGE 3B, GFR 30-44 ML/MIN (HCC): ICD-10-CM

## 2025-05-12 DIAGNOSIS — I12.9 HYPERTENSIVE KIDNEY DISEASE WITH STAGE 3A CHRONIC KIDNEY DISEASE (HCC): ICD-10-CM

## 2025-05-12 DIAGNOSIS — I10 PRIMARY HYPERTENSION: ICD-10-CM

## 2025-05-12 DIAGNOSIS — M16.12 PRIMARY OSTEOARTHRITIS OF LEFT HIP: ICD-10-CM

## 2025-05-12 DIAGNOSIS — M17.12 PRIMARY LOCALIZED OSTEOARTHROSIS OF LEFT LOWER LEG: ICD-10-CM

## 2025-05-12 DIAGNOSIS — K21.9 GASTROESOPHAGEAL REFLUX DISEASE, UNSPECIFIED WHETHER ESOPHAGITIS PRESENT: Primary | ICD-10-CM

## 2025-05-12 PROCEDURE — 1125F AMNT PAIN NOTED PAIN PRSNT: CPT | Performed by: FAMILY MEDICINE

## 2025-05-12 PROCEDURE — G8400 PT W/DXA NO RESULTS DOC: HCPCS | Performed by: FAMILY MEDICINE

## 2025-05-12 PROCEDURE — 1159F MED LIST DOCD IN RCRD: CPT | Performed by: FAMILY MEDICINE

## 2025-05-12 PROCEDURE — 3075F SYST BP GE 130 - 139MM HG: CPT | Performed by: FAMILY MEDICINE

## 2025-05-12 PROCEDURE — 1036F TOBACCO NON-USER: CPT | Performed by: FAMILY MEDICINE

## 2025-05-12 PROCEDURE — 1160F RVW MEDS BY RX/DR IN RCRD: CPT | Performed by: FAMILY MEDICINE

## 2025-05-12 PROCEDURE — G8420 CALC BMI NORM PARAMETERS: HCPCS | Performed by: FAMILY MEDICINE

## 2025-05-12 PROCEDURE — 3078F DIAST BP <80 MM HG: CPT | Performed by: FAMILY MEDICINE

## 2025-05-12 PROCEDURE — 99214 OFFICE O/P EST MOD 30 MIN: CPT | Performed by: FAMILY MEDICINE

## 2025-05-12 PROCEDURE — 1123F ACP DISCUSS/DSCN MKR DOCD: CPT | Performed by: FAMILY MEDICINE

## 2025-05-12 PROCEDURE — G8427 DOCREV CUR MEDS BY ELIG CLIN: HCPCS | Performed by: FAMILY MEDICINE

## 2025-05-12 PROCEDURE — 1090F PRES/ABSN URINE INCON ASSESS: CPT | Performed by: FAMILY MEDICINE

## 2025-05-12 RX ORDER — TRAMADOL HYDROCHLORIDE 50 MG/1
50 TABLET ORAL 3 TIMES DAILY PRN
Qty: 90 TABLET | Refills: 2 | Status: SHIPPED | OUTPATIENT
Start: 2025-05-12 | End: 2025-08-10

## 2025-05-12 RX ORDER — ONDANSETRON 4 MG/1
4 TABLET, FILM COATED ORAL 3 TIMES DAILY PRN
Qty: 30 TABLET | Refills: 5 | Status: SHIPPED | OUTPATIENT
Start: 2025-05-12

## 2025-05-12 RX ORDER — POTASSIUM CHLORIDE 1500 MG/1
20 TABLET, EXTENDED RELEASE ORAL DAILY
Qty: 90 TABLET | Refills: 3 | Status: SHIPPED | OUTPATIENT
Start: 2025-05-12

## 2025-05-12 ASSESSMENT — PATIENT HEALTH QUESTIONNAIRE - PHQ9
SUM OF ALL RESPONSES TO PHQ QUESTIONS 1-9: 0
SUM OF ALL RESPONSES TO PHQ QUESTIONS 1-9: 0
1. LITTLE INTEREST OR PLEASURE IN DOING THINGS: NOT AT ALL
SUM OF ALL RESPONSES TO PHQ QUESTIONS 1-9: 0
SUM OF ALL RESPONSES TO PHQ QUESTIONS 1-9: 0
2. FEELING DOWN, DEPRESSED OR HOPELESS: NOT AT ALL

## 2025-05-12 NOTE — PROGRESS NOTES
Tiffanie Jj is a 79 y.o. female presenting for/with:    Chief Complaint   Patient presents with    Hypertension    Medication Check       Vitals:    05/12/25 0900   BP: 132/68   Pulse: 52   Resp: 18   Temp: 97.2 °F (36.2 °C)   TempSrc: Temporal   SpO2: 99%   Weight: 63.3 kg (139 lb 9.6 oz)   Height: 1.727 m (5' 8\")       Pain Scale: 7/10  Pain Location: Generalized    \"Have you been to the ER, urgent care clinic since your last visit?  Hospitalized since your last visit?\"    NO    “Have you seen or consulted any other health care providers outside of [facility] since your last visit?”    NO                 5/12/2025     8:59 AM   PHQ-9    Little interest or pleasure in doing things 0   Feeling down, depressed, or hopeless 0   PHQ-2 Score 0   PHQ-9 Total Score 0           6/27/2024     9:00 AM 3/6/2024     1:50 PM 2/18/2022    12:00 AM 6/25/2021    12:00 AM   [facility] AMB LEARNING ASSESSMENT   Primary Learner Patient Patient Patient Patient   Primary Language ENGLISH ENGLISH ENGLISH ENGLISH   Learning Preference DEMONSTRATION DEMONSTRATION DEMONSTRATION READING   Answered By pt patient patient patient   Relationship to Learner SELF SELF SELF SELF            5/12/2025     8:59 AM   Amb Fall Risk Assessment and TUG Test   Do you feel unsteady or are you worried about falling?  no   2 or more falls in past year? no   Fall with injury in past year? no           5/12/2025     8:00 AM 1/10/2025    11:00 AM 9/27/2024     9:00 AM 5/17/2024     8:00 AM 3/6/2024     1:00 PM 9/19/2023     9:00 AM   ADL ASSESSMENT   Feeding yourself No Help Needed No Help Needed No Help Needed No Help Needed No Help Needed No Help Needed   Getting from bed to chair No Help Needed No Help Needed No Help Needed No Help Needed No Help Needed No Help Needed   Getting dressed No Help Needed No Help Needed No Help Needed No Help Needed No Help Needed No Help Needed   Bathing or showering No Help Needed No Help Needed No Help Needed No 
tablet Take 1 tablet by mouth daily    tiZANidine (ZANAFLEX) 4 MG tablet TAKE 1 TABLET BY MOUTH THREE TIMES DAILY AS NEEDED FOR SPASM    ascorbic acid (VITAMIN C) 1000 MG tablet Take by mouth    aspirin 81 MG chewable tablet Take 1 tablet by mouth daily    cetirizine (ZYRTEC) 5 MG tablet Take 1 tablet by mouth daily    vitamin D3 (CHOLECALCIFEROL) 125 MCG (5000 UT) TABS tablet Take by mouth daily    cyanocobalamin 1000 MCG tablet Take 1 tablet by mouth daily    dextran 70-hypromellose (TEARS NATURALE) 0.1-0.3 % SOLN opthalmic solution Apply 2 drops to eye as needed    Flaxseed, Linseed, (FLAX SEED OIL) 1000 MG CAPS Take 1,000 mg by mouth daily    polyethylene glycol (GLYCOLAX) 17 GM/SCOOP powder Take 17 g by mouth daily     No current facility-administered medications for this visit.       ROS:   General: No fever, chills, or abnormal weight loss  Respiratory: No cough, dyspnea  CV: No chest pain, palpitations    Objective:  Visit Vitals  /68   Pulse 52   Temp 97.2 °F (36.2 °C) (Temporal)   Resp 18   Ht 1.727 m (5' 8\")   Wt 63.3 kg (139 lb 9.6 oz)   SpO2 99%   BMI 21.23 kg/m²   +2#  Wt Readings from Last 3 Encounters:   05/12/25 63.3 kg (139 lb 9.6 oz)   04/24/25 62.1 kg (137 lb)   01/30/25 66.2 kg (146 lb)     BP Readings from Last 3 Encounters:   05/12/25 132/68   04/24/25 122/88   01/10/25 (!) 145/82     Physical Examination: General appearance - alert, well appearing, and in no distress  Mental status - alert, oriented to person, place, and time  Eyes - pupils equal and reactive, extraocular eye movements intact  ENT - bilateral external ears and nose normal. Normal lips  Neck - supple, no significant adenopathy, no thyromegaly or mass  Chest - clear to auscultation, no wheezes, rales or rhonchi, symmetric air entry  Heart - normal rate, regular rhythm, normal S1, S2, no murmurs, rubs, clicks or gallops  Extremities - peripheral pulses normal, no pedal edema, no clubbing or cyanosis    A/p:  DJD with

## 2025-05-13 LAB
ALBUMIN SERPL-MCNC: 4 G/DL (ref 3.5–5)
ALBUMIN/GLOB SERPL: 1.2 (ref 1.1–2.2)
ALP SERPL-CCNC: 112 U/L (ref 45–117)
ALT SERPL-CCNC: 26 U/L (ref 12–78)
ANION GAP SERPL CALC-SCNC: 6 MMOL/L (ref 2–12)
AST SERPL-CCNC: 23 U/L (ref 15–37)
BILIRUB SERPL-MCNC: 0.4 MG/DL (ref 0.2–1)
BUN SERPL-MCNC: 24 MG/DL (ref 6–20)
BUN/CREAT SERPL: 22 (ref 12–20)
CALCIUM SERPL-MCNC: 9.8 MG/DL (ref 8.5–10.1)
CHLORIDE SERPL-SCNC: 98 MMOL/L (ref 97–108)
CHOLEST SERPL-MCNC: 164 MG/DL
CO2 SERPL-SCNC: 33 MMOL/L (ref 21–32)
CREAT SERPL-MCNC: 1.07 MG/DL (ref 0.55–1.02)
GLOBULIN SER CALC-MCNC: 3.3 G/DL (ref 2–4)
GLUCOSE SERPL-MCNC: 76 MG/DL (ref 65–100)
HDLC SERPL-MCNC: 91 MG/DL
HDLC SERPL: 1.8 (ref 0–5)
LDLC SERPL CALC-MCNC: 60.2 MG/DL (ref 0–100)
POTASSIUM SERPL-SCNC: 3.8 MMOL/L (ref 3.5–5.1)
PROT SERPL-MCNC: 7.3 G/DL (ref 6.4–8.2)
SODIUM SERPL-SCNC: 137 MMOL/L (ref 136–145)
TRIGL SERPL-MCNC: 64 MG/DL
VLDLC SERPL CALC-MCNC: 12.8 MG/DL

## 2025-05-14 LAB
AMPHETAMINES UR QL SCN: NEGATIVE NG/ML
BARBITURATES UR QL SCN: NEGATIVE NG/ML
BENZODIAZ UR QL SCN: NEGATIVE NG/ML
BZE UR QL SCN: NEGATIVE NG/ML
CANNABINOIDS UR QL SCN: NEGATIVE NG/ML
CREAT UR-MCNC: 38 MG/DL (ref 20–300)
LABORATORY COMMENT REPORT: NORMAL
METHADONE UR QL SCN: NEGATIVE NG/ML
OPIATES UR QL SCN: NEGATIVE NG/ML
OXYCODONE+OXYMORPHONE UR QL SCN: NEGATIVE NG/ML
PCP UR QL: NEGATIVE NG/ML
PH UR: 8.3 (ref 4.5–8.9)
PROPOXYPH UR QL SCN: NEGATIVE NG/ML

## 2025-05-23 ENCOUNTER — RESULTS FOLLOW-UP (OUTPATIENT)
Age: 80
End: 2025-05-23

## 2025-07-09 RX ORDER — OMEPRAZOLE 40 MG/1
40 CAPSULE, DELAYED RELEASE ORAL DAILY
Qty: 90 CAPSULE | Refills: 3 | Status: SHIPPED | OUTPATIENT
Start: 2025-07-09

## 2025-09-02 ENCOUNTER — TELEPHONE (OUTPATIENT)
Age: 80
End: 2025-09-02

## 2025-09-02 DIAGNOSIS — I10 PRIMARY HYPERTENSION: Primary | ICD-10-CM

## 2025-09-02 DIAGNOSIS — I71.21 ANEURYSM OF ASCENDING AORTA WITHOUT RUPTURE: ICD-10-CM

## 2025-09-02 RX ORDER — METOPROLOL SUCCINATE 50 MG/1
TABLET, EXTENDED RELEASE ORAL
Qty: 135 TABLET | Refills: 3 | Status: SHIPPED | OUTPATIENT
Start: 2025-09-02